# Patient Record
Sex: FEMALE | Race: WHITE | HISPANIC OR LATINO | Employment: FULL TIME | URBAN - METROPOLITAN AREA
[De-identification: names, ages, dates, MRNs, and addresses within clinical notes are randomized per-mention and may not be internally consistent; named-entity substitution may affect disease eponyms.]

---

## 2017-01-06 ENCOUNTER — HOSPITAL ENCOUNTER (OUTPATIENT)
Dept: RADIOLOGY | Facility: HOSPITAL | Age: 42
Discharge: HOME | End: 2017-01-06
Attending: SPECIALIST
Payer: MEDICAID

## 2017-01-06 DIAGNOSIS — R31.1 BENIGN ESSENTIAL MICROSCOPIC HEMATURIA: ICD-10-CM

## 2017-01-06 PROCEDURE — 74178 CT ABD&PLV WO CNTR FLWD CNTR: CPT

## 2017-01-06 RX ADMIN — IOHEXOL 120 ML: 350 INJECTION, SOLUTION INTRAVENOUS at 17:12

## 2017-02-01 ENCOUNTER — ALLSCRIPTS OFFICE VISIT (OUTPATIENT)
Dept: OTHER | Facility: OTHER | Age: 42
End: 2017-02-01

## 2017-09-18 ENCOUNTER — GENERIC CONVERSION - ENCOUNTER (OUTPATIENT)
Dept: OTHER | Facility: OTHER | Age: 42
End: 2017-09-18

## 2017-09-28 ENCOUNTER — GENERIC CONVERSION - ENCOUNTER (OUTPATIENT)
Dept: OTHER | Facility: OTHER | Age: 42
End: 2017-09-28

## 2018-01-09 NOTE — PROGRESS NOTES
Chief Complaint  Pt walked into the office stating that she felt funny,wanted her BP taken because she had taken an extra BP pill  BP was 160/110  When told that she would have to see a dr because of her BP she then told me she had chest pain and feeling funny  Instructed pt to go to the ER to be evaluated  ER called and made aware of pt  Active Problems    1  ACE-inhibitor cough (786 2,E942 6) (R05,T46 4X5A)   2  Depression (311) (F32 9)   3  HTN (hypertension) (401 9) (I10)   4  Lung nodule (793 11) (R91 1)    Current Meds   1  Citalopram Hydrobromide 20 MG Oral Tablet; TAKE 1 TABLET DAILY; Therapy: 04RTD0222 to (Evaluate:30Zoz6767)  Requested for: 83PZW4387; Last   Rx:71Vef2977 Ordered   2  Losartan Potassium 25 MG Oral Tablet; TAKE ONE TABLET BY MOUTH ONCE DAILY; Therapy: 57VHN1461 to (Arno Tonyher)  Requested for: 48Wnh4741; Last   Rx:82Svu7413 Ordered   3  Naprosyn 500 MG Oral Tablet; Therapy: (Recorded:79Chx2142) to Recorded   4  Ventolin  (90 Base) MCG/ACT Inhalation Aerosol Solution; Therapy: (Recorded:90Frx7734) to Recorded    Allergies    1  No Known Drug Allergies    2  No Known Environmental Allergies   3  No Known Food Allergies    Future Appointments    Date/Time Provider Specialty Site   03/11/2016 03:00 PM VINCENZO Dela Cruz   [facility]     Signatures   Electronically signed by : Marin Connell RN; Mar  2 2016  5:27PM EST                       (Author)    Electronically signed by : VINCENZO Mays ; Mar  2 2016  5:44PM EST                       (Author)

## 2018-01-10 NOTE — MISCELLANEOUS
Provider Comments  Provider Comments:   CALLED PT REGARDING MISSED APPT, r/s for 1/11/17, she forgot she had   appt today /AT        Signatures   Electronically signed by : VINCENZO Garcia ; Dec 28 2016 11:25AM EST                       (Author)

## 2018-01-10 NOTE — CONSULTS
Assessment    1  Former smoker (V15 82) (O81 358)   2  Cough variant asthma (493 82) (J45 991)   3  Lung nodule (793 11) (R91 1)    Plan  Cough variant asthma    · Advair Diskus 250-50 MCG/DOSE Inhalation Aerosol Powder Breath Activated;  INHALE 1 PUFF TWICE DAILY   Rx By: Evan Josue; Dispense: 30 Days ; #:1 Aerosol Powder Breath Activated; Refill: 5; For: Cough variant asthma; SAEID = N; Print Rx   · Breo Ellipta 200-25 MCG/INH Inhalation Aerosol Powder Breath Activated; INHALE  1 PUFFS Daily   Rx By: Evan Josue; Dispense: 30 Days ; #:1 Aerosol Powder Breath Activated; Refill: 5; For: Cough variant asthma; SAEID = N; Dispense Sample  Lung nodule    · * CT CHEST WO CONTRAST; Status:Need Information - Financial Authorization; Requested for:20Mar2017;    Perform:[facility]; Order Comments:f/u CT of chest 3/24/16; Due:20Mar2018; Ordered; For:Lung nodule; Ordered By:Martina Clements; Results/Data  PFT Results v2:     Spirometry: Forced vital capacity: 3 02L and 86% Predicted Values  Forced expiratory volume in one second: 2 46L and 84% Predicted Value  FEV1/FVC ratio is 81  Post Bronchodilator Spirometry:   Lung Volumes:   DLCO:    PFT Interpretation:   normal spirometry  Discussion/Summary  Discussion Summary:   Bipin Contreras is here for evaluation of 3mm LLL pulmonary nodule  She has a vague history of smoking as a teenager; repeat CT of chest without contrast will be done in one year  She has a cough; PFT's were normal  I believe the cough could be cough variant asthma  Advair 250/50 1 puff BID is ordered  She has some intermittent pleuritic chest pain and has been taking Indocin  I explained that indomethacin can worsen or exacerbate cough 2nd to asthma  She is going to follow up with PCP at [address] [place] [address] Avenue is invited to follow up in 6 months or as needed Likely, pulmonary nodule is benign finding     Medication SE Review and Pt Understands Tx: Possible side effects of new medications were reviewed with the patient/guardian today  The treatment plan was reviewed with the patient/guardian  The patient/guardian understands and agrees with the treatment plan      Active Problems    · ACE-inhibitor cough (786 2,E942 6) (X99,T29 9Z2E)   · Anterior pleuritic pain (786 52) (R07 81)   · Depression (311) (F32 9)   · HTN (hypertension) (401 9) (I10)   · Lung nodule (793 11) (R91 1)    Chief Complaint  Chief Complaint Free Text Note Form: Pt presents today for cough  Pt states she was in [facility] March 3rd for high blood pressure  Pt states that while in the hosp she had a CT which detected a lung nodule   Pt states that gets SOB when she exerts her self  Pt states that she has some chest pain which started 2 months ago and was given a med which did not help  Pt states that she has chest pain when she coughs  Pt states that the cough is not productive  History of Present Illness  HPI: Loc Mccabe is here for hospital follow up She was admitted for chest pain  CTA of the chest was done 3/24/16 and no PE was seen  3mm LLL nodule was seen; Loc Mccabe is here for evaluation of the same  She has history of uncontrolled hypertension and she is now using ProAir for SOB  She has been using Seretide (salmeterol 25 mcg/Fluticasone 250 mcg 2 puffs in the morning  She has a vague history of smoking; as a teen she smoked for a short time  and electronic hookah  inhaler      Review of Systems  Complete-Female - Pulm:   Constitutional: No fever, no chills, feels well, no tiredness, no recent weight gain or weight loss  Eyes: no complaints of vision problems  ENT: no rhinitis, no PND, no epistaxis  Cardiovascular: no palpitations, no chest pain  Respiratory: as noted in HPI and no shortness of breath    The patient presents with complaints of sudden onset of occasional episodes of moderate cough, described as loose  Gastrointestinal: no complaints of esophageal reflux, no abdominal pain     Genitourinary: no dysuria  Musculoskeletal: no arthralgias, no joint swelling, no myalgias  Integumentary: no rash, no lesions  Neurological: no headache, no fainting, no weakness  Psychiatric: no anxiety, no depression  Hematologic/Lymphatic: - no complaints of swollen glands  Past Medical History  Active Problems And Past Medical History Reviewed: The active problems and past medical history were reviewed and updated today  Surgical History  Surgical History Reviewed: The surgical history was reviewed and updated today  Family History    1  Family history of hypertension (V17 49) (Z82 49)    2  Family history of Type 2 diabetes mellitus with autonomic neuropathy  Family History Reviewed: The family history was reviewed and updated today  Social History    · Former smoker (X94 08) (C69 816)   · Never a smoker   · No alcohol use   · No drug use  Social History Reviewed: The social history was reviewed and updated today  The social history was reviewed and is unchanged  Current Meds   1  Citalopram Hydrobromide 20 MG Oral Tablet; TAKE 1 TABLET DAILY; Therapy: 44AEI9803 to (Evaluate:62Wlv5959)  Requested for: 85GWI2830; Last   Rx:11Feb2016 Ordered   2  Indomethacin 25 MG Oral Capsule; TAKE 1 CAPSULE 3 TIMES DAILY WITH FOOD AS   NEEDED; Therapy: 42MYI1725 to (Evaluate:25Mar2016); Last Rx:11Mar2016 Ordered   3  Losartan Potassium 25 MG Oral Tablet; TAKE ONE TABLET BY MOUTH ONCE DAILY; Therapy: 14JGL7216 to (Evaluate:95Owk8041)  Requested for: 44Mtc2685; Last   Rx:45Mty5867 Ordered   4  Losartan Potassium 50 MG Oral Tablet; TAKE 1 TABLET DAILY; Therapy: 97KCY5919 to (Evaluate:58Acg3965); Last Rx:11Mar2016 Ordered   5  Naprosyn 500 MG Oral Tablet; Therapy: (Recorded:41Kcn0381) to Recorded   6  Ventolin  (90 Base) MCG/ACT Inhalation Aerosol Solution; Therapy: (Recorded:54Cvg3250) to Recorded  Medication List Reviewed: The medication list was reviewed and updated today  Allergies    1  No Known Drug Allergies    2  No Known Environmental Allergies   3  No Known Food Allergies    Vitals  Vital Signs [Data Includes: Current Encounter]    Recorded: [id number] 02:50PM   Temperature 97 8 F, Oral   Heart Rate 78   Pulse Quality Normal   Respiration 12   Respiration Quality Normal   Systolic 342, LUE, Sitting   Diastolic 86, LUE, Sitting   Height 5 ft 3 in   Weight 137 lb    BMI Calculated 24 27   BSA Calculated 1 65   O2 Saturation 97, RA     Physical Exam    Constitutional   General appearance: No acute distress, well appearing and well nourished  Eyes   Examination of pupil and irises: Anicteric, pupils reactive  Ears, Nose, Mouth, and Throat   External inspection of ears and nose: Normal     Nasal mucosa, septum, and turbinates: Normal without edema or erythema  Lips, teeth, and gums: Normal, good dentition  Oropharynx: Normal with no erythema, edema, exudate or lesions  Mallampati Classification: 2  Neck   Neck: Supple, symmetric, trachea midline, no masses  Jugular veins: Normal     Pulmonary   Chest: Normal     Respiratory effort: No increased work of breathing or signs of respiratory distress  Auscultation of lungs: Clear to auscultation, no rales, no crackles, no wheezing  Cardiovascular   Auscultation of heart: Normal rate and rhythm, normal S1 and S2, no murmurs  Examination of extremities for edema and/or varicosities: Normal     Abdomen   Abdomen: Soft, non-tender  Lymphatic   Palpation of lymph nodes in neck: No lymphadenopathy  Musculoskeletal   Gait and station: Normal     Digits and nails: Normal without clubbing or cyanosis  Neurologic   Mental Status: Normal  Not confused, no evidence of dementia, good comprehension, good concentration  Skin   Skin and subcutaneous tissue: Limited exam shows no rash      Psychiatric   Orientation to person, place and time: Normal     Mood and affect: Normal        Signatures   Electronically signed by : JUNIOR Lua; Mar 24 2016  3:17PM EST                       (Author)

## 2018-01-12 NOTE — PROGRESS NOTES
Assessment    1  Encounter for screening mammogram for breast cancer (V76 12) (Z12 31)   2  Screening for cervical cancer (V76 2) (Z12 4)   3  BMI 25 0-25 9,adult (V85 21) (Z68 25)   4  Depression (311) (F32 9)   5  HTN (hypertension) (401 9) (I10)   6  Lung nodule (793 11) (R91 1)   7  Hematuria (599 70) (R31 9)   8  Encounter for preventive health examination (V70 0) (Z00 00)   9  Screening for cholesterol level (V77 91) (Z13 220)   10  Need for influenza vaccination (V04 81) (Z23)    Plan  Health Maintenance, Encounter for screening mammogram for breast cancer    · * MAMMO SCREENING BILATERAL W CAD; Status:Hold For - Scheduling; Requested  for:25Nov2016;   Hematuria    · Urology Referral Other Physician Referral  Consult  Status: Hold For - Scheduling   Requested for: 99YLH8211  are Referring to a non-SL Preferred Provider : Insurance Coverage  Care Summary provided  : Yes   · (1) Elsy; Status:Resulted - Requires Verification;   Done:  88KHE2939 10:05AM   · (1) CBC/PLT/DIFF; Status:Resulted - Requires Verification;   Done: 40JEC0682 10:05AM  Need for influenza vaccination    · Fluzone Quadrivalent 0 5 ML Intramuscular Suspension  Screening for cervical cancer    · (1) THIN PREP PAP FOLLOW UP WITH IMAGING; Status:Active; Requested  for:25Nov2016;   Maturation index required? : No  HPV? : Regardless of Interpretation  Screening for cholesterol level, Screening for diabetes mellitus    · (1) LIPID PANEL, FASTING; Status:Active;  Requested for:25Nov2016;   Screening for diabetes mellitus    · (1) HEMOGLOBIN A1C; Status:Resulted - Requires Verification;   Done: 87LZP1203  10:05AM    Discussion/Summary    Ref[facility] to [facility] for f/u with psychiatry  Counselled pt that she should not have suddenly discontinued her medication on her own  Pt refuses to start any other antidepressants prior to seeing [facility]  Gave requisition for CBC, BMP, HbA1C, Lipid panel, Mammogram  Referral to Urology for chronic microhematuria  Urine for microscopy, Cx  Flu shot administered  Lifestyle and medication compliance counselling  Pap with reflex HPV done  D/W Dr Patel Lobato  Chief Complaint  patient here for CP and PAP and pelvic, would like to discuss urine issues      History of Present Illness  HPI: 35 yo F with a Hx of HTN, lung nodule and Depression presents for her CPE  Pt states she has been complinat with her HTN meds, with her highest recorded /90 1 month ago  She states that 1 week ago she discontinued her Citalopram as she thought it was not helping her  She still has occasional feeings of depression and has not followed with [facility] as she was told there are no [place] speaking counsellors or psychiatrists  Denies any chest pain, sob, headaches, change in vision, fever, dysuria,frequency, abdominal pain  No acute concerns expressed today      Review of Systems    Constitutional: no fever, not feeling poorly, no chills and not feeling tired  Eyes: no eyesight problems and no purulent discharge from the eyes  ENT: no sore throat, no hearing loss, no nasal discharge and no hoarseness  Cardiovascular: No complaints of slow heart rate, no fast heart rate, no chest pain, no palpitations, no leg claudication, no lower extremity edema  Respiratory: No complaints of shortness of breath, no wheezing, no cough, no SOB on exertion, no orthopnea, no PND  Gastrointestinal: No complaints of abdominal pain, no constipation, no nausea or vomiting, no diarrhea, no bloody stools  Genitourinary: no dysuria and no incontinence  Musculoskeletal: no arthralgias and no myalgias  Integumentary: no rashes and no skin lesions  Neurological: no headache, no numbness, no tingling, no confusion, no dizziness and no difficulty walking  Psychiatric: depression  Hematologic/Lymphatic: no swollen glands and no swollen glands in the neck       Over the past 2 weeks, how often have you been bothered by the following problems? 1 ) Little interest or pleasure in doing things? Several days  2 ) Feeling down, depressed or hopeless? Several days  3 ) Trouble falling asleep or sleeping too much? Several days  4 ) Feeling tired or having little energy? Not at all    5 ) Poor appetite or overeating? Not at all    6 ) Feeling bad about yourself, or that you are a failure, or have let yourself or your family down? Not at all    7 ) Trouble concentrating on things, such as reading a newspaper or watching television? Not at all    8 ) Moving or speaking so slowly that other people could have noticed, or the opposite, moving or speaking faster than usual? Not at all    9 ) Thoughts that you would be better off dead or of hurting yourself in some way? Not at all  Score 3      Active Problems    1  ACE-inhibitor cough (786 2,E942 6) (R05,T46 4X5A)   2  Cerumen impaction (380 4) (H61 20)   3  Cough variant asthma (493 82) (J45 991)   4  Depression (311) (F32 9)   5  Hematuria (599 70) (R31 9)   6  HTN (hypertension) (401 9) (I10)   7  Lung nodule (793 11) (R91 1)    Past Medical History    · History of Anterior pleuritic pain (786 52) (R07 81)    Family History  Mother    · Family history of hypertension (V17 49) (Z82 49)  Father    · Family history of Type 2 diabetes mellitus with autonomic neuropathy    Social History    · Former smoker (V15 82) (Q49 769)   · Never a smoker   · No alcohol use   · No drug use    Current Meds   1  Advair Diskus 250-50 MCG/DOSE Inhalation Aerosol Powder Breath Activated; INHALE   1 PUFF TWICE DAILY; Therapy: (Recorded:29Tza4595) to Recorded   2  Citalopram Hydrobromide 40 MG Oral Tablet; take 1 tablet by mouth once daily; Therapy: 14CHE7473 to (Evaluate:03Jan2017)  Requested for: 77JWE5102; Last   Rx:51Upx9580 Ordered   3  HydroCHLOROthiazide 12 5 MG Oral Tablet; TAKE 1 TABLET DAILY;    Therapy: 99BYJ7470 to (Evaluate:10Jan2017)  Requested for: 98NEY3589; Last Rx: 75BVJ8162 Ordered   4  Losartan Potassium 50 MG Oral Tablet; Take 1 tablet twice daily; Therapy: 64TEW9038 to (Guevara Bue)  Requested for: 81SKN5114; Last   Rx:18Vrl9149 Ordered   5  Ventolin  (90 Base) MCG/ACT Inhalation Aerosol Solution; INHALE 1 TO 2   PUFFS EVERY 4 TO 6 HOURS AS NEEDED  Requested for: 19GMQ2773; Last   NA:78VMT3151 Ordered    Allergies    1  No Known Drug Allergies    2  No Known Environmental Allergies   3  No Known Food Allergies    Vitals   Recorded: 46HBI2145 08:13AM   Temperature 97 1 F   Heart Rate 67   Respiration 18   Systolic 98, LUE, Sitting   Diastolic 70, LUE, Sitting   Height 5 ft 3 in   Weight 146 lb    BMI Calculated 25 86   BSA Calculated 1 69     Physical Exam    Constitutional   General appearance: No acute distress, well appearing and well nourished  Head and Face   Head and face: Normal     Palpation of the face and sinuses: No sinus tenderness  Eyes   Conjunctiva and lids: No swelling, erythema or discharge  Pupils and irises: Equal, round, reactive to light  Ophthalmoscopic examination: Normal fundi and optic discs  Ears, Nose, Mouth, and Throat   External inspection of ears and nose: Normal     Otoscopic examination: Tympanic membranes translucent with normal light reflex  Canals patent without erythema  Nasal mucosa, septum, and turbinates: Normal without edema or erythema  Lips, teeth, and gums: Normal, good dentition  Oropharynx: Normal with no erythema, edema, exudate or lesions  Neck   Neck: Supple, symmetric, trachea midline, no masses  Thyroid: Normal, no thyromegaly  Pulmonary   Respiratory effort: No increased work of breathing or signs of respiratory distress  Auscultation of lungs: Clear to auscultation  Cardiovascular   Auscultation of heart: Normal rate and rhythm, normal S1 and S2, no murmurs  Pedal pulses: 2+ bilaterally      Peripheral vascular exam: Normal     Examination of extremities for edema and/or varicosities: Normal     Chest   Breasts: Normal, no dimpling or skin changes appreciated  Palpation of breasts and axillae: Normal, no masses palpated  Chest: Normal     Abdomen   Abdomen: Non-tender, no masses  Liver and spleen: No hepatomegaly or splenomegaly  Examination for hernias: No hernia appreciated  Genitourinary   External genitalia and vagina: Normal, no lesions appreciated  Urethra: Normal, no discharge  Bladder: Not distended, no tenderness  Cervix: Normal, no lesions, Pap obtained  Uterus: Normal size, no tenderness, no masses  Adnexa/Parametria: Normal, no masses or tenderness  Lymphatic   Palpation of lymph nodes in neck: No lymphadenopathy  Palpation of lymph nodes in axillae: No lymphadenopathy  Palpation of lymph nodes in groin: No lymphadenopathy  Musculoskeletal   Gait and station: Normal     Digits and nails: Normal without clubbing or cyanosis  Joints, bones, and muscles: Normal     Range of motion: Normal     Muscle strength/tone: Normal     Skin   Skin and subcutaneous tissue: Normal without rashes or lesions  Palpation of skin and subcutaneous tissue: Normal turgor  Neurologic   Cranial nerves: Cranial nerves II-XII intact  Reflexes: 2+ and symmetric  Sensation: No sensory loss  Psychiatric   Judgment and insight: Normal     Orientation to person, place, and time: Normal     Mood and affect: Normal        Results/Data  (1) CBC/PLT/DIFF 31YPS9840 10:05AM Candace Reyes     Test Name Result Flag Reference   WBC 7 2 x10E3/uL  3 4-10 8   RBC 4 23 x10E6/uL  3 77-5 28   Hemoglobin 13 3 g/dL  11 1-15 9   Hematocrit 38 4 %  34 0-46  6   MCV 91 fL  79-97   MCH 31 4 pg  26 6-33 0   MCHC 34 6 g/dL  31 5-35 7   RDW 13 2 %  12 3-15 4   Platelets 895 P37H4/YW  150-379   Neutrophils 63 %     Lymphs 28 %     Monocytes 8 %     Eos 1 %     Basos 0 %     Neutrophils (Absolute) 4 6 x10E3/uL  1 4-7 0   Lymphs (Absolute) 2 0 x10E3/uL  0 7-3 1 Monocytes(Absolute) 0 6 x10E3/uL  0 1-0 9   Eos (Absolute) 0 1 x10E3/uL  0 0-0 4   Baso (Absolute) 0 0 x10E3/uL  0 0-0 2   Immature Granulocytes 0 %     Immature Grans (Abs) 0 0 x10E3/uL  0 0-0 1     (1) BASIC METABOLIC PROFILE 57YYE1380 10:05AM Candace Reyes     Test Name Result Flag Reference   Glucose, Serum 84 mg/dL  65-99   BUN 22 mg/dL  6-24   Creatinine, Serum 0 80 mg/dL  0 57-1 00   eGFR If NonAfricn Am 93 mL/min/1 73  >59   eGFR If Africn Am 107 mL/min/1 73  >59   BUN/Creatinine Ratio 28 H 9-23   Sodium, Serum 138 mmol/L  136-144   Potassium, Serum 4 1 mmol/L  3 5-5 2   Chloride, Serum 102 mmol/L     Carbon Dioxide, Total 20 mmol/L  18-29   Calcium, Serum 9 0 mg/dL  8 7-10 2     (1) HEMOGLOBIN A1C 25Nov2016 10:05AM Candace Reyes     Test Name Result Flag Reference   Hemoglobin A1c 5 5 %  4 8-5 6   Pre-diabetes: 5 7 - 6 4           Diabetes: >6 4           Glycemic control for adults with diabetes: <7 0       Attending Note  Attending Note: Attending Note: I discussed the case with the Resident and reviewed the Resident's note and I agree with the Resident management plan as it was presented to me  Level of Participation: I was present in clinic, but did not examine the patient  Future Appointments    Date/Time Provider Specialty Site   12/28/2016 08:30 AM VINCENZO Elizabeth  [facility]     Signatures   Electronically signed by : VINCENZO Marquez ; Nov 25 2016  4:17PM EST                       (Author)    Electronically signed by :  ABE Sanderson ; Nov 28 2016 12:06PM EST                       (Author)

## 2018-01-13 NOTE — PROGRESS NOTES
Assessment    1  HTN (hypertension) (401 9) (I10)   2  ACE-inhibitor cough (786 2,E942 6) (H30,G37 1Y6U)    Plan  ACE-inhibitor cough, HTN (hypertension)    · Losartan Potassium 25 MG Oral Tablet; TAKE ONE TABLET BY MOUTH ONCE  DAILY  HTN (hypertension)    · Lisinopril 20 MG Oral Tablet    Discussion/Summary    Dry cough after starting lisinopril without evidence of URI  will DC lisinopril 2/2 SE and start ARB  RTC in 1 week for BP check and evaluation for SE  Possible side effects of new medications were reviewed with the patient/guardian today  The treatment plan was reviewed with the patient/guardian  The patient/guardian understands and agrees with the treatment plan      Chief Complaint  F/U HTN      History of Present Illness  HPI: 37 yo with HTN, recently started on lisinopril because of SE to verapamil  Now presents with 1 week of a constant dry cough  Also had blurred vision, HA, and dizziness on Saturday after taking ACEi but that resolved  Denies fevers, chills, sick contact, constitutional symptoms  No known alleviating or aggravating factors  Review of Systems    Constitutional: as noted in HPI, no fever and no chills  ENT: no sore throat, no nasal discharge and no hoarseness  Cardiovascular: no chest pain, no intermittent leg claudication, no palpitations and no lower extremity edema  Respiratory: cough, but no shortness of breath, no wheezing and no shortness of breath during exertion  Gastrointestinal: no abdominal pain, no nausea, no vomiting, no constipation and no diarrhea  Musculoskeletal: no arthralgias and no myalgias  Active Problems    1  HTN (hypertension) (401 9) (I10)    Family History    1  Family history of hypertension (V17 49) (Z82 49)    2  Family history of Type 2 diabetes mellitus with autonomic neuropathy    Social History    · Never a smoker   · No alcohol use   · No drug use    Current Meds   1   Lisinopril 20 MG Oral Tablet; take 1 tablet by mouth once daily; Therapy: [id number] to (Last Rx:12Jan2016) Ordered    Allergies    1  No Known Drug Allergies    Vitals   Recorded: [id number] 04:20PM   Temperature 98 4 F   Heart Rate 80   Respiration 20   Systolic 729   Diastolic 56   Height 5 ft 3 in   Weight 131 lb 4 oz   BMI Calculated 23 25   BSA Calculated 1 62   O2 Saturation 95     Physical Exam    Constitutional   General appearance: No acute distress, well appearing and well nourished  Ears, Nose, Mouth, and Throat   External inspection of ears and nose: Normal     Otoscopic examination: Tympanic membranes translucent with normal light reflex  Canals patent without erythema  Nasal mucosa, septum, and turbinates: Normal without edema or erythema  Oropharynx: Normal with no erythema, edema, exudate or lesions  Pulmonary   Respiratory effort: No increased work of breathing or signs of respiratory distress  Auscultation of lungs: Clear to auscultation  Cardiovascular   Auscultation of heart: Normal rate and rhythm, normal S1 and S2, without murmurs  Musculoskeletal   Inspection/palpation of joints, bones, and muscles: Normal     Psychiatric   Mood and affect: Normal          Results/Data  eCalcs - Health Calculators [id number] 04:29PM User, Ahs     Test Name Result Flag Reference   SBIRT Screen - Tobacco Screening Result Negative         Attending Note  Attending Note [facility]: Attending Note: I discussed the case with the Resident and reviewed the Resident's note and I agree with the Resident management plan as it was presented to me  Signatures   Electronically signed by : Maday Valle MD; Jan 26 2016  9:09PM EST                       (Author)    Electronically signed by :  ABE Thacker ; Jan 27 2016 10:01AM EST                       (Author)

## 2018-01-13 NOTE — MISCELLANEOUS
Message   Recorded as Task   Date: 09/19/2017 03:23 PM, Created By: Jamia Barrientos   Task Name: Med Renewal   Assigned To: Candace Reyes   Regarding Patient: Bethany Nichols, Status: In Progress   Comment:    Nataly Busby - 19 Sep 2017 3:23 PM     TASK CREATED  TELMISA HCTZ 40-12 5MG TAB NOT COVERED INSURANCE PLAN REQUEST LOSART/HCTZ OR GENERIC ACE INHIB/HCTZ TO [facility]   Candace Reyes - 19 Sep 2017 5:02 PM     TASK REASSIGNED: Previously Assigned To [employer],team   Anmolsingh,Candace - 21 Sep 2017 4:11 PM     TASK EDITED  Attempted telephoning patient multiple times however no voicemail available  Candace Reyes - 21 Sep 2017 4:11 PM     TASK IN PROGRESS   Candace Reyes - 22 Sep 2017 10:32 AM     TASK EDITED  tried calling pt again, no response  Candace Reyes - 28 Sep 2017 11:27 AM     TASK EDITED  Finally got through to patient and informed her that Micardis is not currently being covered by her insurance  Patient is not willing to pay out of pocket which would cost $130 monthly  We'll resume losartan 50 mg p o  b i d , HCTZ 12 5 mg p o  daily  Patient advised to keep a log of daily blood pressures and will review in 2 weeks  Patient verbalized understanding          Plan  Cough variant asthma    · Advair Diskus 250-50 MCG/DOSE Inhalation Aerosol Powder Breath Activated;  INHALE 1 PUFF TWICE DAILY  HTN (hypertension)    · Telmisartan-HCTZ 40-12 5 MG Oral Tablet (Micardis HCT)   · HydroCHLOROthiazide 12 5 MG Oral Tablet; TAKE 1 TABLET DAILY   · Losartan Potassium 50 MG Oral Tablet; take one tablet by mouth twice daily    Signatures   Electronically signed by : VINCENZO Norris ; Sep 28 2017 11:27AM EST                       (Author)

## 2018-01-14 VITALS
RESPIRATION RATE: 18 BRPM | OXYGEN SATURATION: 98 % | SYSTOLIC BLOOD PRESSURE: 112 MMHG | BODY MASS INDEX: 25.52 KG/M2 | HEART RATE: 67 BPM | HEIGHT: 63 IN | DIASTOLIC BLOOD PRESSURE: 70 MMHG | WEIGHT: 144 LBS

## 2018-01-22 VITALS
OXYGEN SATURATION: 99 % | HEART RATE: 79 BPM | RESPIRATION RATE: 18 BRPM | WEIGHT: 141 LBS | HEIGHT: 63 IN | DIASTOLIC BLOOD PRESSURE: 70 MMHG | SYSTOLIC BLOOD PRESSURE: 100 MMHG | BODY MASS INDEX: 24.98 KG/M2

## 2018-02-26 DIAGNOSIS — I10 ESSENTIAL HYPERTENSION: Primary | ICD-10-CM

## 2018-02-26 RX ORDER — HYDROCHLOROTHIAZIDE 12.5 MG/1
1 TABLET ORAL DAILY
COMMUNITY
Start: 2016-05-05 | End: 2018-02-26 | Stop reason: SDUPTHER

## 2018-02-28 RX ORDER — HYDROCHLOROTHIAZIDE 12.5 MG/1
12.5 TABLET ORAL DAILY
Qty: 30 TABLET | Refills: 3 | Status: SHIPPED | OUTPATIENT
Start: 2018-02-28 | End: 2018-10-15

## 2018-03-20 ENCOUNTER — HOSPITAL ENCOUNTER (EMERGENCY)
Facility: HOSPITAL | Age: 43
Discharge: HOME | End: 2018-03-20
Attending: EMERGENCY MEDICINE | Admitting: EMERGENCY MEDICINE
Payer: COMMERCIAL

## 2018-03-20 ENCOUNTER — APPOINTMENT (EMERGENCY)
Dept: RADIOLOGY | Facility: HOSPITAL | Age: 43
End: 2018-03-20
Payer: COMMERCIAL

## 2018-03-20 VITALS
TEMPERATURE: 99.5 F | SYSTOLIC BLOOD PRESSURE: 134 MMHG | HEART RATE: 69 BPM | BODY MASS INDEX: 23.92 KG/M2 | HEIGHT: 63 IN | WEIGHT: 135 LBS | OXYGEN SATURATION: 98 % | DIASTOLIC BLOOD PRESSURE: 79 MMHG | RESPIRATION RATE: 18 BRPM

## 2018-03-20 DIAGNOSIS — J18.9 RIGHT LOWER LOBE PNEUMONIA: Primary | ICD-10-CM

## 2018-03-20 DIAGNOSIS — D72.825 BANDEMIA: ICD-10-CM

## 2018-03-20 LAB
ANION GAP SERPL CALCULATED.3IONS-SCNC: 11 MMOL/L (ref 4–13)
APTT PPP: 26 SECONDS (ref 23–35)
BASOPHILS # BLD AUTO: 0.03 THOUSAND/UL (ref 0–0.1)
BASOPHILS NFR MAR MANUAL: 1 % (ref 0–1)
BUN SERPL-MCNC: 11 MG/DL (ref 5–25)
CALCIUM SERPL-MCNC: 8.8 MG/DL (ref 8.3–10.1)
CHLORIDE SERPL-SCNC: 104 MMOL/L (ref 100–108)
CO2 SERPL-SCNC: 27 MMOL/L (ref 21–32)
CREAT SERPL-MCNC: 0.83 MG/DL (ref 0.6–1.3)
ERYTHROCYTE [DISTWIDTH] IN BLOOD BY AUTOMATED COUNT: 13.7 % (ref 11.6–15.1)
GFR SERPL CREATININE-BSD FRML MDRD: 87 ML/MIN/1.73SQ M
GLUCOSE SERPL-MCNC: 104 MG/DL (ref 65–140)
HCT VFR BLD AUTO: 35.2 % (ref 37–47)
HGB BLD-MCNC: 11.6 G/DL (ref 12–16)
INR PPP: 0.98 (ref 0.86–1.16)
LG PLATELETS BLD QL SMEAR: PRESENT
LYMPHOCYTES # BLD AUTO: 1.38 THOUSAND/UL (ref 0.6–4.47)
LYMPHOCYTES # BLD AUTO: 53 %
MCH RBC QN AUTO: 30.9 PG (ref 27–31)
MCHC RBC AUTO-ENTMCNC: 33 G/DL (ref 31.4–37.4)
MCV RBC AUTO: 94 FL (ref 82–98)
MONOCYTES # BLD AUTO: 0.13 THOUSAND/UL (ref 0–1.22)
MONOCYTES NFR BLD AUTO: 5 % (ref 4–12)
NEUTS BAND NFR BLD MANUAL: 6 % (ref 0–8)
NEUTS SEG # BLD: 1.07 THOUSAND/UL (ref 1.81–6.82)
NEUTS SEG NFR BLD AUTO: 35 %
NRBC BLD AUTO-RTO: 0 /100 WBCS
NT-PROBNP SERPL-MCNC: 25 PG/ML
PLATELET # BLD AUTO: 215 THOUSANDS/UL (ref 130–400)
PLATELET BLD QL SMEAR: ADEQUATE
PMV BLD AUTO: 7.9 FL (ref 8.9–12.7)
POTASSIUM SERPL-SCNC: 3.4 MMOL/L (ref 3.5–5.3)
PROTHROMBIN TIME: 10.3 SECONDS (ref 9.4–11.7)
RBC # BLD AUTO: 3.75 MILLION/UL (ref 4.2–5.4)
SODIUM SERPL-SCNC: 142 MMOL/L (ref 136–145)
TOTAL CELLS COUNTED SPEC: 100
TROPONIN I SERPL-MCNC: 0.02 NG/ML
WBC # BLD AUTO: 2.6 THOUSAND/UL (ref 4.8–10.8)

## 2018-03-20 PROCEDURE — 85610 PROTHROMBIN TIME: CPT | Performed by: EMERGENCY MEDICINE

## 2018-03-20 PROCEDURE — 85027 COMPLETE CBC AUTOMATED: CPT | Performed by: EMERGENCY MEDICINE

## 2018-03-20 PROCEDURE — 80048 BASIC METABOLIC PNL TOTAL CA: CPT | Performed by: EMERGENCY MEDICINE

## 2018-03-20 PROCEDURE — 36415 COLL VENOUS BLD VENIPUNCTURE: CPT | Performed by: EMERGENCY MEDICINE

## 2018-03-20 PROCEDURE — 96374 THER/PROPH/DIAG INJ IV PUSH: CPT

## 2018-03-20 PROCEDURE — 94640 AIRWAY INHALATION TREATMENT: CPT

## 2018-03-20 PROCEDURE — 71045 X-RAY EXAM CHEST 1 VIEW: CPT

## 2018-03-20 PROCEDURE — 99284 EMERGENCY DEPT VISIT MOD MDM: CPT

## 2018-03-20 PROCEDURE — 83880 ASSAY OF NATRIURETIC PEPTIDE: CPT | Performed by: EMERGENCY MEDICINE

## 2018-03-20 PROCEDURE — 84484 ASSAY OF TROPONIN QUANT: CPT | Performed by: EMERGENCY MEDICINE

## 2018-03-20 PROCEDURE — 85730 THROMBOPLASTIN TIME PARTIAL: CPT | Performed by: EMERGENCY MEDICINE

## 2018-03-20 PROCEDURE — 93005 ELECTROCARDIOGRAM TRACING: CPT

## 2018-03-20 PROCEDURE — 85007 BL SMEAR W/DIFF WBC COUNT: CPT | Performed by: EMERGENCY MEDICINE

## 2018-03-20 RX ORDER — IPRATROPIUM BROMIDE AND ALBUTEROL SULFATE 2.5; .5 MG/3ML; MG/3ML
3 SOLUTION RESPIRATORY (INHALATION) ONCE
Status: COMPLETED | OUTPATIENT
Start: 2018-03-20 | End: 2018-03-20

## 2018-03-20 RX ORDER — FLUTICASONE PROPIONATE 50 MCG
2 SPRAY, SUSPENSION (ML) NASAL DAILY
COMMUNITY

## 2018-03-20 RX ORDER — METHYLPREDNISOLONE SODIUM SUCCINATE 125 MG/2ML
125 INJECTION, POWDER, LYOPHILIZED, FOR SOLUTION INTRAMUSCULAR; INTRAVENOUS ONCE
Status: COMPLETED | OUTPATIENT
Start: 2018-03-20 | End: 2018-03-20

## 2018-03-20 RX ORDER — AZITHROMYCIN 250 MG/1
250 TABLET, FILM COATED ORAL DAILY
Qty: 6 TABLET | Refills: 0 | Status: SHIPPED | OUTPATIENT
Start: 2018-03-20 | End: 2018-03-25

## 2018-03-20 RX ORDER — AZITHROMYCIN 250 MG/1
500 TABLET, FILM COATED ORAL ONCE
Status: COMPLETED | OUTPATIENT
Start: 2018-03-20 | End: 2018-03-20

## 2018-03-20 RX ORDER — LAMOTRIGINE 25 MG/1
25 TABLET ORAL
COMMUNITY
End: 2018-10-15

## 2018-03-20 RX ORDER — QUETIAPINE FUMARATE 50 MG/1
50 TABLET, FILM COATED ORAL
COMMUNITY
End: 2018-10-15

## 2018-03-20 RX ORDER — CLONAZEPAM 1 MG/1
1 TABLET ORAL
COMMUNITY

## 2018-03-20 RX ORDER — PRAZOSIN HYDROCHLORIDE 2 MG/1
5 CAPSULE ORAL
COMMUNITY

## 2018-03-20 RX ORDER — SODIUM CHLORIDE FOR INHALATION 0.9 %
3 VIAL, NEBULIZER (ML) INHALATION ONCE
Status: DISCONTINUED | OUTPATIENT
Start: 2018-03-20 | End: 2018-03-20

## 2018-03-20 RX ADMIN — METHYLPREDNISOLONE SODIUM SUCCINATE 125 MG: 125 INJECTION, POWDER, FOR SOLUTION INTRAMUSCULAR; INTRAVENOUS at 22:12

## 2018-03-20 RX ADMIN — IPRATROPIUM BROMIDE AND ALBUTEROL SULFATE 3 ML: .5; 3 SOLUTION RESPIRATORY (INHALATION) at 22:06

## 2018-03-20 RX ADMIN — AZITHROMYCIN 500 MG: 250 TABLET, FILM COATED ORAL at 23:05

## 2018-03-21 LAB
ATRIAL RATE: 70 BPM
P AXIS: 65 DEGREES
PR INTERVAL: 178 MS
QRS AXIS: 43 DEGREES
QRSD INTERVAL: 78 MS
QT INTERVAL: 430 MS
QTC INTERVAL: 464 MS
T WAVE AXIS: 26 DEGREES
VENTRICULAR RATE: 70 BPM

## 2018-03-21 PROCEDURE — 93010 ELECTROCARDIOGRAM REPORT: CPT | Performed by: INTERNAL MEDICINE

## 2018-03-21 NOTE — ED PROVIDER NOTES
History  Chief Complaint   Patient presents with    Asthma     asthma attack around 6pm   cough  took inhaler  after using pain in chest  cough started Friday  History provided by:  Patient  Shortness of Breath   Severity:  Moderate  Onset quality:  Gradual  Timing:  Constant  Progression:  Worsening  Chronicity:  New  Context comment:  Hx of asthma, took albuterol at home with no improvement in sxs  Relieved by:  Nothing  Worsened by: Activity, coughing, deep breathing, exertion and movement  Ineffective treatments:  None tried  Associated symptoms: chest pain, cough and wheezing    Associated symptoms: no abdominal pain, no fever, no headaches, no rash, no sore throat, no sputum production, no syncope and no swollen glands        Prior to Admission Medications   Prescriptions Last Dose Informant Patient Reported? Taking? Mometasone Furo-Formoterol Fum (DULERA) 200-5 MCG/ACT AERO Past Week at Unknown time  Yes Yes   Sig: Inhale   QUEtiapine (SEROquel) 50 mg tablet 3/19/2018 at Unknown time  Yes Yes   Sig: Take 50 mg by mouth daily at bedtime as needed   albuterol (PROVENTIL HFA,VENTOLIN HFA) 90 mcg/act inhaler 3/20/2018 at Unknown time  No Yes   Sig: Inhale 2 puffs every 4 (four) hours as needed for wheezing or shortness of breath (or cough)     clonazePAM (KlonoPIN) 1 mg tablet 3/19/2018 at Unknown time  Yes Yes   Sig: Take 1 mg by mouth daily at bedtime as needed for seizures   fluticasone (FLONASE) 50 mcg/act nasal spray 3/20/2018 at Unknown time  Yes Yes   Si sprays into each nostril daily   hydrochlorothiazide (HYDRODIURIL) 12 5 mg tablet 3/20/2018 at Unknown time  No Yes   Sig: Take 1 tablet (12 5 mg total) by mouth daily   lamoTRIgine (LaMICtal) 25 mg tablet 3/19/2018 at Unknown time  Yes Yes   Sig: Take 25 mg by mouth daily at bedtime   losartan (COZAAR) 25 mg tablet 3/20/2018 at Unknown time  Yes Yes   Sig: Take 25 mg by mouth    meclizine (ANTIVERT) 25 mg tablet Past Week at Unknown time Yes Yes   Sig: Take 25 mg by mouth 3 (three) times a day as needed for dizziness  prazosin (MINIPRESS) 2 mg capsule 3/19/2018 at Unknown time  Yes Yes   Sig: Take 2 mg by mouth daily at bedtime      Facility-Administered Medications: None       Past Medical History:   Diagnosis Date    Asthma     Hypertension        Past Surgical History:   Procedure Laterality Date    CHOLECYSTECTOMY         Family History   Problem Relation Age of Onset    Hypertension Mother     Diabetes Father     Pulmonary embolism Father      I have reviewed and agree with the history as documented  Social History   Substance Use Topics    Smoking status: Never Smoker    Smokeless tobacco: Never Used    Alcohol use No        Review of Systems   Constitutional: Negative for chills and fever  HENT: Negative for rhinorrhea, sore throat and trouble swallowing  Eyes: Negative for pain  Respiratory: Positive for cough, chest tightness, shortness of breath and wheezing  Negative for sputum production and stridor  Cardiovascular: Positive for chest pain  Negative for leg swelling and syncope  Gastrointestinal: Negative for abdominal pain, diarrhea and nausea  Endocrine: Negative for polyuria  Genitourinary: Negative for dysuria, flank pain and urgency  Musculoskeletal: Negative for joint swelling, myalgias and neck stiffness  Skin: Negative for rash  Allergic/Immunologic: Negative for immunocompromised state  Neurological: Negative for dizziness, syncope, weakness, numbness and headaches  Psychiatric/Behavioral: Negative for confusion and suicidal ideas  All other systems reviewed and are negative        Physical Exam  ED Triage Vitals [03/20/18 2140]   Temperature Pulse Respirations Blood Pressure SpO2   99 5 °F (37 5 °C) 69 18 134/79 98 %      Temp Source Heart Rate Source Patient Position - Orthostatic VS BP Location FiO2 (%)   Tympanic Monitor Sitting Right arm --      Pain Score       9 Orthostatic Vital Signs  Vitals:    03/20/18 2140   BP: 134/79   Pulse: 69   Patient Position - Orthostatic VS: Sitting       Physical Exam   Constitutional: She is oriented to person, place, and time  She appears well-developed and well-nourished  HENT:   Head: Normocephalic and atraumatic  Eyes: EOM are normal  Pupils are equal, round, and reactive to light  Neck: Normal range of motion  Neck supple  Cardiovascular: Normal rate and regular rhythm  Exam reveals no friction rub  No murmur heard  Pulmonary/Chest: No respiratory distress  She has no wheezes  She has rales (right lung base )  Abdominal: Soft  Bowel sounds are normal  She exhibits no distension  There is no tenderness  Musculoskeletal: Normal range of motion  She exhibits no edema or tenderness  Neurological: She is alert and oriented to person, place, and time  Skin: Skin is warm  No rash noted  Psychiatric: She has a normal mood and affect  Nursing note and vitals reviewed        ED Medications  Medications   ipratropium-albuterol (DUO-NEB) 0 5-2 5 mg/3 mL inhalation solution 3 mL (3 mL Nebulization Given 3/20/18 2206)   methylPREDNISolone sodium succinate (Solu-MEDROL) injection 125 mg (125 mg Intravenous Given 3/20/18 2212)   azithromycin (ZITHROMAX) tablet 500 mg (500 mg Oral Given 3/20/18 2305)       Diagnostic Studies  Results Reviewed     Procedure Component Value Units Date/Time    Basic metabolic panel [[id number]]  (Abnormal) Collected:  03/20/18 2209    Lab Status:  Final result Specimen:  Blood from Arm, Left Updated:  03/20/18 2241     Sodium 142 mmol/L      Potassium 3 4 (L) mmol/L      Chloride 104 mmol/L      CO2 27 mmol/L      Anion Gap 11 mmol/L      BUN 11 mg/dL      Creatinine 0 83 mg/dL      Glucose 104 mg/dL      Calcium 8 8 mg/dL      eGFR 87 ml/min/1 73sq m     Narrative:         National Kidney Disease Education Program recommendations are as follows:  GFR calculation is accurate only with a steady state creatinine  Chronic Kidney disease less than 60 ml/min/1 73 sq  meters  Kidney failure less than 15 ml/min/1 73 sq  meters  NT-BNP PRO [[id number]]  (Normal) Collected:  03/20/18 2209    Lab Status:  Final result Specimen:  Blood from Arm, Left Updated:  03/20/18 2241     NT-proBNP 25 pg/mL     Troponin I [[id number]]  (Normal) Collected:  03/20/18 2209    Lab Status:  Final result Specimen:  Blood from Arm, Left Updated:  03/20/18 2239     Troponin I 0 02 ng/mL     Narrative:         [employer] Chemistry analyzer 99% cutoff is > 0 04 ng/mL in network labs    o cTnI 99% cutoff is useful only when applied to patients in the clinical setting of myocardial ischemia  o cTnI 99% cutoff should be interpreted in the context of clinical history, ECG findings and possibly cardiac imaging to establish correct diagnosis  o cTnI 99% cutoff may be suggestive but clearly not indicative of a coronary event without the clinical setting of myocardial ischemia  CBC and differential [[id number]]  (Abnormal) Collected:  03/20/18 2209    Lab Status:  Final result Specimen:  Blood from Arm, Left Updated:  03/20/18 2236     WBC 2 60 (L) Thousand/uL      RBC 3 75 (L) Million/uL      Hemoglobin 11 6 (L) g/dL      Hematocrit 35 2 (L) %      MCV 94 fL      MCH 30 9 pg      MCHC 33 0 g/dL      RDW 13 7 %      MPV 7 9 (L) fL      Platelets 908 Thousands/uL      nRBC 0 /100 WBCs     Protime-INR [[id number]]  (Normal) Collected:  03/20/18 2209    Lab Status:  Final result Specimen:  Blood from Arm, Left Updated:  03/20/18 2236     Protime 10 3 seconds      INR 0 98    APTT [[id number]]  (Normal) Collected:  03/20/18 2209    Lab Status:  Final result Specimen:  Blood from Arm, Left Updated:  03/20/18 2236     PTT 26 seconds     Narrative:          Therapeutic Heparin Range = 60-90 seconds                 XR chest portable   ED Interpretation by Nerissa Ramey DO (03/20 2252)   Possible right lower ext, infiltrate                  Procedures  ECG 12 Lead Documentation  Date/Time: 3/20/2018 9:52 PM  Performed by: Eric Fuentes by: Alissa Crowder     ECG reviewed by me, the ED Provider: yes    Patient location:  ED  Previous ECG:     Previous ECG:  Compared to current    Similarity:  No change  Interpretation:     Interpretation: normal    Rate:     ECG rate assessment: normal    Rhythm:     Rhythm: sinus rhythm    Ectopy:     Ectopy: none    QRS:     QRS axis:  Normal    QRS intervals:  Normal  Conduction:     Conduction: normal    ST segments:     ST segments:  Normal  T waves:     T waves: normal             Phone Contacts  ED Phone Contact    ED Course  ED Course                                MDM  Number of Diagnoses or Management Options  Bandemia: new and requires workup  Right lower lobe pneumonia [facility]): new and requires workup  Diagnosis management comments: 49-year-old female presentsEmergency department with a history of asthma  Differential diagnosis includes asthma exacerbation, pneumonia, pneumothorax, atelectasis, bronchitis, x-ray done in the emergency department shows possible development of right lower lobe infiltrate  Empiric treatment with antibiotics at this point time plan outpatient management followup given strict instructions when to return back to the emergency department  Pt re-examined and evaluated after testing and treatment  Spoke with the patient and feeling improved and sxs have resolved  Will discharge home with close f/u with pcp and instructed to return to the ED if sxs worsen or continue  Pt agrees with the plan for discharge and feels comfortable to go home with proper f/u  Advised to return for worsening or additional problems  Diagnostic tests were reviewed and questions answered  Diagnosis, care plan and treatment options were discussed  The patient understand instructions and will follow up as directed           Amount and/or Complexity of Data Reviewed  Clinical lab tests: ordered and reviewed  Tests in the radiology section of CPT®: ordered and reviewed      CritCare Time    Disposition  Final diagnoses:   Right lower lobe pneumonia ([address] )   Bandemia     Time reflects when diagnosis was documented in both MDM as applicable and the Disposition within this note     Time User Action Codes Description Comment    3/20/2018 10:52 PM Justin Gtz [J18 1] Right lower lobe pneumonia ([address] )     3/20/2018 10:52 PM Justin Gtz Kemar Gamma [address] [facility]       ED Disposition     ED Disposition Condition Comment    Discharge  Heather Ruiz discharge to home/self care  Condition at discharge: Stable        Follow-up Information     Follow up With Specialties Details Why Contact Info    Priti Frazier MD Family Medicine Call in 2 days If symptoms worsen [address] [facility]  [phone]          Discharge Medication List as of 3/20/2018 10:53 PM      START taking these medications    Details   azithromycin (ZITHROMAX Z-KATE) 250 mg tablet Take 1 tablet (250 mg total) by mouth daily for 5 days Take two tabs on day one and then one tab each day for 4 days, Starting Tue 3/20/2018, Until Sun 3/25/2018, Print         CONTINUE these medications which have NOT CHANGED    Details   albuterol (PROVENTIL HFA,VENTOLIN HFA) 90 mcg/act inhaler Inhale 2 puffs every 4 (four) hours as needed for wheezing or shortness of breath (or cough)  , Starting 2/7/2016, Until Discontinued, Print      clonazePAM (KlonoPIN) 1 mg tablet Take 1 mg by mouth daily at bedtime as needed for seizures, Historical Med      fluticasone (FLONASE) 50 mcg/act nasal spray 2 sprays into each nostril daily, Historical Med      hydrochlorothiazide (HYDRODIURIL) 12 5 mg tablet Take 1 tablet (12 5 mg total) by mouth daily, Starting Wed 2/28/2018, Normal      lamoTRIgine (LaMICtal) 25 mg tablet Take 25 mg by mouth daily at bedtime, Historical Med      losartan (COZAAR) 25 mg tablet Take 25 mg by mouth , Until Discontinued, Historical Med      meclizine (ANTIVERT) 25 mg tablet Take 25 mg by mouth 3 (three) times a day as needed for dizziness  , Until Discontinued, Historical Med      Mometasone Furo-Formoterol Fum (DULERA) 200-5 MCG/ACT AERO Inhale, Historical Med      prazosin (MINIPRESS) 2 mg capsule Take 2 mg by mouth daily at bedtime, Historical Med      QUEtiapine (SEROquel) 50 mg tablet Take 50 mg by mouth daily at bedtime as needed, Historical Med           No discharge procedures on file      ED Provider  Electronically Signed by           Nerissa Ramey DO  03/21/18 [id number]

## 2018-03-21 NOTE — DISCHARGE INSTRUCTIONS
Neumonía adquirida en la comunidad   LO QUE NECESITA SABER:   La neumonía adquirida en la comunidad (NAC) es kim infección pulmonar que se contrae fuera de un hospital o de un hogar de ancianos  Kirk pulmones se inflaman y no pueden funcionar de la [place]  La neumonía adquirida en la comunidad puede ser causada por kim bacteria, un virus o un hongo  INSTRUCCIONES [facility]BRE [facility]:   Regrese a la roxann de emergencias si:   · Usted está confundido y no puede pensar con claridad  · Usted tiene más dificultad para respirar  · Kirk labios o uñas de las deshawn se tornan grises o [place]  Pregúntele a aleman [place] vitaminas y minerales son adecuados para usted  · Kirk síntomas no mejoran o [place]  · Usted está orinando menos o no Naz Parth  · Usted tiene preguntas o inquietudes acerca de aleman condición o cuidado  Medicamentos:   · Medicamentos,  para tratar kim infección bacterial, viral o causada por hongos  También podrían administrarle un medicamento para dilatar kirk bronquios y ayudarle a que respire con mayor facilidad  · [place] kirk medicamentos amrit se le haya indicado  Consulte con aleman médico si usted bryan que aleman medicamento no le está ayudando o si presenta efectos secundarios  Infórmele si es alérgico a cualquier medicamento  Mantenga kim lista actualizada de los [place], las vitaminas y los productos herbales que toya  Incluya los siguientes datos de los medicamentos: cantidad, frecuencia y motivo de administración  Traiga con usted la lista o los envases de la píldoras a kirk citas de seguimiento  Lleve la lista de los medicamentos con usted en alok de kim emergencia  Programe kim epi con aleman médico dentro de 3 días o según indicaciones:  Es posible que deban tomarle otra radiografía  Anote kirk preguntas para que se acuerde de hacerlas renata kirk visitas  Tos y respiración profunda:  La respiración profunda ayuda a abrir las vía aéreas de kirk pulmones   El toser [place] a expulsar las flemas de kirk pulmones  Respire hondo y contenga el aliento tanto amrit pueda  Luego expulse el aire de kirk pulmones con kim tos o expectoración qing y profunda  Expectore la flema  [place] 10 inhalaciones profundas seguidas, kim detrás de la [place], cada hora que usted esté despierto  Recuerde toser después de hacer kim inhalación profunda  No fume ni permita que otras personas fumen a aleman alrededor:  La nicotina y otras sustancias químicas que contienen los cigarrillos y cigarros pueden dañar los pulmones  Pida información a aleman médico si usted actualmente fuma y necesita ayuda para dejar de fumar  Los cigarrillos electrónicos o tabaco sin humo todavía contienen nicotina  Consulte con aleman médico antes de [employer]  Mantenga aleman neumonía adquirida en la comunidad bajo control en aleman hogar:   · Respire aire cálido y húmedo  [place] ayuda a aflojar la flema  Coloque sin presionar kim toalla pequeña tibia y húmeda sobre aleman [place] and [place] y aleman boca  Un humidificador de [employer] puede poner humedad en el aire  · [address] se le haya indicado  Pregunte a aleman médico cuánto líquido debe saurabh a diario y qué líquidos le recomienda  Los líquidos ayudan a disolver la flema y expulsarla de aleman cuerpo  · Dese golpecitos suaves en el pecho  [place] ayuda a aflojar la flema y hace que sea más fácil toser  Acuéstese boca arriba con la hayden más abajo que aleman pecho varias veces al día y golpéese con suavidad el pecho  · Descanse lo suficiente  El descanso ayuda a que aleman cuerpo se recupere  Prevenir el CAP:   · [place] frecuentemente con agua y Albert  Lleve un gel antibacterial con usted  Usted puede usar el gel para limpiar kirk deshawn cuando no William Dharmesh y agua disponibles  No se toque los ojos, la nariz o la boca a menos que se haya lavado las deshawn saqib  · Limpie las superficies con frecuencia    [address] yumiko, los muebles de la cocina, teléfonos celulares y otras superficies que la gente toca con frecuencia  · Siempre cubra allison boca al toser  Tosa en un pañuelo desechable o en la manga de allison camisa para no contagiar los gérmenes con tasha deshawn  · Trate de evitar a las personas que están resfriadas o tienen gripe  Si usted está enfermo, manténgase alejado de otras personas lo más que pueda  · [facility] vacunas  Es posible que usted necesite recibir kim vacuna que sirve para prevenir la neumonía  Acuda a que le apliquen la vacuna de la influenza (gripe) cada año tan pronto amrit esté disponible  © 2017 [address] Information is for End User's use only and may not be sold, redistributed or otherwise used for commercial purposes  All illustrations and images included in CareNotes® are the copyrighted property of A D A M , Inc  or Darian Ferrari  Esta información es sólo para uso en educación  Allison intención no es darle un consejo médico sobre enfermedades o tratamientos  Colsulte con allison Ladena Creed farmacéutico antes de seguir cualquier régimen médico para saber si es seguro y efectivo para usted  Neumonía adquirida en la comunidad   LO QUE NECESITA SABER:   La neumonía adquirida en la comunidad (NAC) es kim infección pulmonar que se contrae fuera de un hospital o de un hogar de ancianos  Tasha pulmones se inflaman y no pueden funcionar de la [place]  La neumonía adquirida en la comunidad puede ser causada por kim bacteria, un virus o un hongo  INSTRUCCIONES [facility]BRE [facility]ITAL[facility]IA:   Busque atención médica de inmediato si:   · Usted está confundido y no puede pensar con claridad  · Usted tiene más dificultad para respirar  · Tasha labios o uñas de las deshawn se tornan grises o [place]  Pregúntele a allison Reyne Rave vitaminas y minerales son adecuados para usted  · Tasha síntomas no mejoran o [facility]  · Usted está orinando menos o no [facility]      · Usted tiene preguntas o inquietudes acerca de allison [place]  Medicamentos:   · Medicamentos,  para tratar kim infección bacterial, viral o causada por hongos  También podrían administrarle un medicamento para dilatar kirk bronquios y ayudarle a que respire con mayor facilidad  · [place] kirk medicamentos amrit se le haya indicado  Consulte con aleman médico si usted bryan que aleman medicamento no le está ayudando o si presenta efectos secundarios  Infórmele si es alérgico a cualquier medicamento  Mantenga kim lista actualizada de los [place], las vitaminas y los productos herbales que toya  Incluya los siguientes datos de los medicamentos: cantidad, frecuencia y motivo de administración  Traiga con usted la lista o los envases de la píldoras a kirk citas de seguimiento  Lleve la lista de los medicamentos con usted en alok de kim emergencia  Programe kim epi con aleman médico dentro de 3 días o según indicaciones:  Es posible que deban tomarle otra radiografía  Anote kirk preguntas para que se acuerde de hacerlas renata kirk visitas  Tos y respiración profunda:  La respiración profunda ayuda a abrir las vía aéreas de kirk pulmones  El toser [place] [employer] de kirk pulmones  Respire hondo y contenga el aliento tanto amrit pueda  Luego expulse el aire de kirk pulmones con kim tos o expectoración qing y profunda  Expectore la flema  [place] 10 inhalaciones profundas seguidas, kim detrás de la [place], cada hora que usted esté despierto  Recuerde toser después de hacer kim inhalación profunda  No fume ni permita que otras personas fumen a aleman alrededor:  La nicotina y otras sustancias químicas que contienen los cigarrillos y cigarros pueden dañar los pulmones  Pida información a aleman médico si usted actualmente fuma y necesita ayuda para dejar de fumar  Los cigarrillos electrónicos o tabaco sin humo todavía contienen nicotina  Consulte con aleman médico antes de [employer]     Mantenga aleman neumonía adquirida en la comunidad bajo control en aleman hogar: · Respire aire cálido y húmedo  [facility] ayuda a aflojar la flema  Coloque sin presionar kim toalla pequeña tibia y húmeda sobre aleman Priscella Comfort y aleman boca  Un humidificador de [employer] puede poner humedad en el aire  · [address] se le haya indicado  Pregunte a aleman médico cuánto líquido debe saurabh a diario y qué líquidos le recomienda  Los líquidos ayudan a disolver la flema y expulsarla de aleman cuerpo  · Dese golpecitos suaves en el pecho  [facility] ayuda a aflojar la flema y hace que sea más fácil toser  Acuéstese boca arriba con la hayden más abajo que aleman pecho varias veces al día y golpéese con suavidad el pecho  · Descanse lo suficiente  El descanso ayuda a que aleman cuerpo se recupere  Prevenir el CAP:   · [employer] frecuentemente con agua y Albert  Lleve un gel antibacterial con usted  Usted puede usar el gel para limpiar kirk deshawn cuando no Arthor Matsu y agua disponibles  No se toque los ojos, la nariz o la boca a menos que se haya lavado las deshawn saqib  · Limpie las superficies con frecuencia  Limpie las perillas de las yumiko, los muebles de la cocina, teléfonos celulares y otras superficies que la gente toca con frecuencia  · Siempre cubra aleman boca al toser  Tosa en un pañuelo desechable o en la manga de aleman camisa para no contagiar los gérmenes con kirk deshawn  · Trate de evitar a las personas que están resfriadas o tienen gripe  Si usted está enfermo, manténgase alejado de otras personas lo más que pueda  · [place] vacunas  Es posible que usted necesite recibir kim vacuna que sirve para prevenir la neumonía  Acuda a que le apliquen la vacuna de la influenza (gripe) cada año tan pronto amrit esté disponible  © 2017 [address] Information is for End User's use only and may not be sold, redistributed or otherwise used for commercial purposes   All illustrations and images included in CareNotes® are the copyrighted property of [employer]  or Darian Vonda  Esta información es sólo para uso en educación  Aleman intención no es darle un consejo médico sobre enfermedades o tratamientos  Colsulte con aleman [place] farmacéutico antes de seguir cualquier régimen médico para saber si es seguro y efectivo para usted

## 2018-04-19 DIAGNOSIS — I10 HYPERTENSION, UNSPECIFIED TYPE: ICD-10-CM

## 2018-04-19 DIAGNOSIS — J45.909 ASTHMA, UNSPECIFIED ASTHMA SEVERITY, UNSPECIFIED WHETHER COMPLICATED, UNSPECIFIED WHETHER PERSISTENT: Primary | ICD-10-CM

## 2018-04-23 DIAGNOSIS — I10 ESSENTIAL HYPERTENSION: Primary | ICD-10-CM

## 2018-04-23 RX ORDER — LOSARTAN POTASSIUM 25 MG/1
25 TABLET ORAL DAILY
Qty: 30 TABLET | Refills: 4 | Status: SHIPPED | OUTPATIENT
Start: 2018-04-23 | End: 2018-10-15

## 2018-05-17 DIAGNOSIS — J45.20 MILD INTERMITTENT ASTHMA WITHOUT COMPLICATION: Primary | ICD-10-CM

## 2018-05-21 RX ORDER — LOSARTAN POTASSIUM 50 MG/1
TABLET ORAL
Qty: 180 TABLET | Refills: 1 | Status: SHIPPED | OUTPATIENT
Start: 2018-05-21 | End: 2018-10-15

## 2018-05-21 RX ORDER — ALBUTEROL SULFATE 90 UG/1
2 AEROSOL, METERED RESPIRATORY (INHALATION) EVERY 4 HOURS PRN
Qty: 18 G | Refills: 3 | Status: SHIPPED | OUTPATIENT
Start: 2018-05-21 | End: 2021-06-04

## 2018-10-15 ENCOUNTER — OFFICE VISIT (OUTPATIENT)
Dept: FAMILY MEDICINE CLINIC | Facility: CLINIC | Age: 43
End: 2018-10-15
Payer: COMMERCIAL

## 2018-10-15 VITALS
WEIGHT: 131.2 LBS | BODY MASS INDEX: 23.24 KG/M2 | SYSTOLIC BLOOD PRESSURE: 102 MMHG | TEMPERATURE: 97.7 F | DIASTOLIC BLOOD PRESSURE: 68 MMHG | HEART RATE: 65 BPM | OXYGEN SATURATION: 100 %

## 2018-10-15 DIAGNOSIS — Z12.31 SCREENING MAMMOGRAM, ENCOUNTER FOR: ICD-10-CM

## 2018-10-15 DIAGNOSIS — I10 HTN (HYPERTENSION), BENIGN: Primary | ICD-10-CM

## 2018-10-15 DIAGNOSIS — Z23 NEED FOR INFLUENZA VACCINATION: ICD-10-CM

## 2018-10-15 PROCEDURE — 99213 OFFICE O/P EST LOW 20 MIN: CPT | Performed by: FAMILY MEDICINE

## 2018-10-15 PROCEDURE — 90686 IIV4 VACC NO PRSV 0.5 ML IM: CPT | Performed by: FAMILY MEDICINE

## 2018-10-15 PROCEDURE — 90471 IMMUNIZATION ADMIN: CPT | Performed by: FAMILY MEDICINE

## 2018-10-15 RX ORDER — TELMISARTAN AND HYDROCHLORTHIAZIDE 80; 25 MG/1; MG/1
1 TABLET ORAL DAILY
Qty: 90 TABLET | Refills: 1 | Status: SHIPPED | OUTPATIENT
Start: 2018-10-15 | End: 2020-09-01 | Stop reason: SDUPTHER

## 2018-10-15 RX ORDER — CITALOPRAM 40 MG/1
1 TABLET ORAL DAILY
COMMUNITY
Start: 2016-02-11 | End: 2018-10-15

## 2018-10-15 RX ORDER — TELMISARTAN 80 MG/1
40 TABLET ORAL DAILY
COMMUNITY
End: 2018-10-15

## 2018-10-15 RX ORDER — OXCARBAZEPINE 300 MG/1
300 TABLET, FILM COATED ORAL
COMMUNITY

## 2018-10-15 RX ORDER — VERAPAMIL HYDROCHLORIDE 240 MG/1
1 CAPSULE, EXTENDED RELEASE ORAL DAILY
COMMUNITY
Start: 2015-12-15 | End: 2018-10-15

## 2018-10-15 RX ORDER — TELMISARTAN AND HYDROCHLORTHIAZIDE 80; 25 MG/1; MG/1
1 TABLET ORAL DAILY
COMMUNITY
End: 2018-10-15 | Stop reason: SDUPTHER

## 2018-10-15 NOTE — PROGRESS NOTES
Assessment/Plan:     HTN (hypertension), benign  -     telmisartan-hydrochlorothiazide (MICARDIS HCT) 80-25 MG per tablet; Take 1 tablet by mouth daily  -     Comprehensive metabolic panel; Future  -     Lipid panel; Future    Screening mammogram, encounter for  -     Mammo screening bilateral w cad; Future    Need for influenza vaccination  -     SYRINGE/SINGLE-DOSE VIAL: influenza vaccine, [id number], quadrivalent, 0 5 mL, preservative-free, for patients 3+ yr (FLUZONE)    Other orders  -     OXcarbazepine (TRILEPTAL) 300 mg tablet; Take 300 mg by mouth daily at bedtime    Patient doing stable at this time  She had a benign physical exam   Her vitals were stable  I recommended that she continue the Micardis at the 40-12 5 mg dosing and she can continue the packet she has, until she runs out and needs refills  It appeared to be that [facility] had the cheapest pricing on Micardis for when she needs it next time  Her blood pressure is stable, but if she feels lightheaded or dizzy I want her to call our office, and not take her blood pressure medication  She should continue checking her blood pressures at home  Return to clinic with any questions  Subjective:   Patient ID: Sadie Wolff is a 43 y o  female  HPI  Pt here today to discuss medications  She is from [facility], visited last yr and takes micardis -plus for her HTN  Pt used to have BP's in the 197'V systolic while sleeping and having nightmares  Pt sees family guidance for bipolar, and anxiety with nightmares and PTSD  Pt's mother had HTN & father had depression  Pt feels stressed with anxiety  Pt will hold her micardis if her BP is low or she feels lightheaded or dizzy  Patient's blood pressure is well controlled on this regimen  She takes the Micardis 80-25 mg,  And takes 1/2 tablet per day,   With 40-12 5 mg  She does not want to change her medications       Patient overdue for her mammogram   Last 1 was 12/12/16 and was BI-RADS 1- bilaterally  Review of Systems   Constitutional: Negative for chills and fever  HENT: Negative for congestion, postnasal drip, sinus pain and sore throat  Respiratory: Negative for cough, choking, chest tightness, shortness of breath and wheezing  Cardiovascular: Negative for chest pain, palpitations and leg swelling  Gastrointestinal: Negative for abdominal pain, constipation, diarrhea, nausea and vomiting  Genitourinary: Negative for dysuria and urgency  Musculoskeletal: Negative for arthralgias and back pain  Neurological: Negative for dizziness, tremors, light-headedness, numbness and headaches  Psychiatric/Behavioral: Negative for decreased concentration  The patient is not nervous/anxious  Objective:  Vitals:    10/15/18 1511   BP: 102/68   Pulse: 65   Temp: 97 7 °F (36 5 °C)   SpO2: 100%      Physical Exam   Constitutional: She is oriented to person, place, and time  She appears well-developed and well-nourished  No distress  HENT:   Head: Normocephalic and atraumatic  Nose: Nose normal    Eyes: Right eye exhibits no discharge  Left eye exhibits no discharge  No scleral icterus  Neck: Normal range of motion  Neck supple  Cardiovascular: Normal rate, regular rhythm, normal heart sounds and intact distal pulses  Exam reveals no gallop and no friction rub  No murmur heard  Pulmonary/Chest: Effort normal and breath sounds normal  No stridor  No respiratory distress  She has no wheezes  She has no rales  She exhibits no tenderness  Musculoskeletal: Normal range of motion  She exhibits no edema, tenderness or deformity  Neurological: She is alert and oriented to person, place, and time  Skin: Skin is warm and dry  No rash noted  She is not diaphoretic  No erythema  No pallor  Psychiatric: She has a normal mood and affect  Her behavior is normal  Judgment and thought content normal    Vitals reviewed

## 2018-11-16 ENCOUNTER — APPOINTMENT (OUTPATIENT)
Dept: LAB | Facility: HOSPITAL | Age: 43
End: 2018-11-16
Payer: COMMERCIAL

## 2018-11-16 ENCOUNTER — TRANSCRIBE ORDERS (OUTPATIENT)
Dept: ADMINISTRATIVE | Facility: HOSPITAL | Age: 43
End: 2018-11-16

## 2018-11-16 DIAGNOSIS — I10 HTN (HYPERTENSION), BENIGN: ICD-10-CM

## 2018-11-16 LAB
ALBUMIN SERPL BCP-MCNC: 3.6 G/DL (ref 3.5–5)
ALP SERPL-CCNC: 58 U/L (ref 46–116)
ALT SERPL W P-5'-P-CCNC: 36 U/L (ref 12–78)
ANION GAP SERPL CALCULATED.3IONS-SCNC: 7 MMOL/L (ref 4–13)
AST SERPL W P-5'-P-CCNC: 23 U/L (ref 5–45)
BILIRUB SERPL-MCNC: 0.3 MG/DL (ref 0.2–1)
BUN SERPL-MCNC: 19 MG/DL (ref 5–25)
CALCIUM SERPL-MCNC: 8.8 MG/DL (ref 8.3–10.1)
CHLORIDE SERPL-SCNC: 103 MMOL/L (ref 100–108)
CHOLEST SERPL-MCNC: 162 MG/DL (ref 50–200)
CO2 SERPL-SCNC: 26 MMOL/L (ref 21–32)
CREAT SERPL-MCNC: 0.78 MG/DL (ref 0.6–1.3)
GFR SERPL CREATININE-BSD FRML MDRD: 94 ML/MIN/1.73SQ M
GLUCOSE P FAST SERPL-MCNC: 88 MG/DL (ref 65–99)
HDLC SERPL-MCNC: 77 MG/DL (ref 40–60)
LDLC SERPL CALC-MCNC: 80 MG/DL (ref 0–100)
NONHDLC SERPL-MCNC: 85 MG/DL
POTASSIUM SERPL-SCNC: 3.7 MMOL/L (ref 3.5–5.3)
PROT SERPL-MCNC: 6.9 G/DL (ref 6.4–8.2)
SODIUM SERPL-SCNC: 136 MMOL/L (ref 136–145)
TRIGL SERPL-MCNC: 26 MG/DL

## 2018-11-16 PROCEDURE — 80053 COMPREHEN METABOLIC PANEL: CPT

## 2018-11-16 PROCEDURE — 80061 LIPID PANEL: CPT

## 2018-11-16 PROCEDURE — 36415 COLL VENOUS BLD VENIPUNCTURE: CPT

## 2018-11-29 ENCOUNTER — HOSPITAL ENCOUNTER (OUTPATIENT)
Dept: RADIOLOGY | Facility: HOSPITAL | Age: 43
Discharge: HOME | End: 2018-11-29
Payer: COMMERCIAL

## 2018-11-29 VITALS — BODY MASS INDEX: 22.2 KG/M2 | WEIGHT: 130 LBS | HEIGHT: 64 IN

## 2018-11-29 DIAGNOSIS — Z12.31 SCREENING MAMMOGRAM, ENCOUNTER FOR: ICD-10-CM

## 2018-11-29 PROCEDURE — 77063 BREAST TOMOSYNTHESIS BI: CPT

## 2018-11-29 PROCEDURE — 77067 SCR MAMMO BI INCL CAD: CPT

## 2020-09-01 DIAGNOSIS — I10 HTN (HYPERTENSION), BENIGN: ICD-10-CM

## 2020-09-02 RX ORDER — TELMISARTAN AND HYDROCHLORTHIAZIDE 80; 25 MG/1; MG/1
1 TABLET ORAL DAILY
Qty: 90 TABLET | Refills: 1 | Status: SHIPPED | OUTPATIENT
Start: 2020-09-02 | End: 2021-05-16

## 2021-01-17 ENCOUNTER — APPOINTMENT (EMERGENCY)
Dept: RADIOLOGY | Facility: HOSPITAL | Age: 46
End: 2021-01-17
Payer: COMMERCIAL

## 2021-01-17 ENCOUNTER — HOSPITAL ENCOUNTER (EMERGENCY)
Facility: HOSPITAL | Age: 46
Discharge: HOME | End: 2021-01-17
Attending: GENERAL PRACTICE
Payer: COMMERCIAL

## 2021-01-17 VITALS
HEART RATE: 69 BPM | BODY MASS INDEX: 24.34 KG/M2 | OXYGEN SATURATION: 98 % | TEMPERATURE: 99 F | RESPIRATION RATE: 20 BRPM | SYSTOLIC BLOOD PRESSURE: 103 MMHG | DIASTOLIC BLOOD PRESSURE: 68 MMHG | WEIGHT: 141.8 LBS

## 2021-01-17 DIAGNOSIS — S00.01XA ABRASION OF SCALP, INITIAL ENCOUNTER: ICD-10-CM

## 2021-01-17 DIAGNOSIS — S06.0X0A CONCUSSION WITHOUT LOSS OF CONSCIOUSNESS, INITIAL ENCOUNTER: Primary | ICD-10-CM

## 2021-01-17 PROCEDURE — 96372 THER/PROPH/DIAG INJ SC/IM: CPT

## 2021-01-17 PROCEDURE — 90715 TDAP VACCINE 7 YRS/> IM: CPT | Performed by: GENERAL PRACTICE

## 2021-01-17 PROCEDURE — 99283 EMERGENCY DEPT VISIT LOW MDM: CPT

## 2021-01-17 PROCEDURE — 72125 CT NECK SPINE W/O DYE: CPT

## 2021-01-17 PROCEDURE — 90471 IMMUNIZATION ADMIN: CPT

## 2021-01-17 PROCEDURE — 99284 EMERGENCY DEPT VISIT MOD MDM: CPT | Performed by: GENERAL PRACTICE

## 2021-01-17 PROCEDURE — G1004 CDSM NDSC: HCPCS

## 2021-01-17 PROCEDURE — 70450 CT HEAD/BRAIN W/O DYE: CPT

## 2021-01-17 RX ORDER — ACETAMINOPHEN 325 MG/1
975 TABLET ORAL ONCE
Status: COMPLETED | OUTPATIENT
Start: 2021-01-17 | End: 2021-01-17

## 2021-01-17 RX ORDER — KETOROLAC TROMETHAMINE 30 MG/ML
30 INJECTION, SOLUTION INTRAMUSCULAR; INTRAVENOUS ONCE
Status: COMPLETED | OUTPATIENT
Start: 2021-01-17 | End: 2021-01-17

## 2021-01-17 RX ADMIN — KETOROLAC TROMETHAMINE 30 MG: 30 INJECTION, SOLUTION INTRAMUSCULAR at 15:19

## 2021-01-17 RX ADMIN — TETANUS TOXOID, REDUCED DIPHTHERIA TOXOID AND ACELLULAR PERTUSSIS VACCINE, ADSORBED 0.5 ML: 5; 2.5; 8; 8; 2.5 SUSPENSION INTRAMUSCULAR at 14:55

## 2021-01-17 RX ADMIN — ACETAMINOPHEN 975 MG: 325 TABLET, FILM COATED ORAL at 14:55

## 2021-01-17 NOTE — ED PROVIDER NOTES
History  Chief Complaint   Patient presents with   Emaline Folds Fall     States she was playing inside on a skateboard and fell backwards striking head on door 10 pm yesterday  Denies LOC  States had bleeding scalp, feels dizzy has headache  States neck pain, ambu collar applied  Denies other injuries     Head Injury    Neck Injury     Patient is a 66-year-old female with a past medical history of hypertension who presents to the emergency room with head injury  States she was playing on her son's skateboard yesterday when she fell backwards and landed on the back of her head  She had a wound with bleeding that stops spontaneously  Since then she has felt like resting and has symptoms including headache, dizziness, increased fatigue, difficulty concentrating, and nausea without vomiting  She is also complaining of neck pain  No other injuries  She had no loss of consciousness  Fall  Associated symptoms: headaches    Associated symptoms: no abdominal pain, no chest pain, no nausea and no vomiting    Neck Injury  Associated symptoms: headaches    Associated symptoms: no abdominal pain, no chest pain, no congestion, no diarrhea, no fever, no myalgias, no nausea, no rhinorrhea, no shortness of breath, no sore throat, no vomiting and no wheezing        Prior to Admission Medications   Prescriptions Last Dose Informant Patient Reported? Taking?    Mometasone Furo-Formoterol Fum (DULERA) 200-5 MCG/ACT AERO   Yes No   Sig: Inhale   OXcarbazepine (TRILEPTAL) 300 mg tablet  Self Yes No   Sig: Take 300 mg by mouth daily at bedtime   VENTOLIN  (90 Base) MCG/ACT inhaler   No No   Sig: INHALE ONE TO TWO PUFFS BY MOUTH EVERY 4 TO 6 HOURS AS NEEDED   albuterol (PROVENTIL HFA,VENTOLIN HFA) 90 mcg/act inhaler   No No   Sig: Inhale 2 puffs every 4 (four) hours as needed for wheezing or shortness of breath (or cough)   Patient not taking: Reported on 10/15/2018    clonazePAM (KlonoPIN) 1 mg tablet   Yes No   Sig: Take 1 mg by mouth daily at bedtime as needed for seizures   fluticasone (FLONASE) 50 mcg/act nasal spray   Yes No   Si sprays into each nostril daily   fluticasone-salmeterol (ADVAIR DISKUS) 250-50 mcg/dose inhaler   Yes No   Sig: Inhale 1 puff 2 (two) times a day   hyoscyamine (LEVSIN/SL) 0 125 mg SL tablet   Yes No   Sig: Place under the tongue 4 times daily   meclizine (ANTIVERT) 25 mg tablet   Yes No   Sig: Take 25 mg by mouth 3 (three) times a day as needed for dizziness  prazosin (MINIPRESS) 2 mg capsule  Self Yes No   Sig: Take 5 mg by mouth daily at bedtime     telmisartan-hydrochlorothiazide (MICARDIS HCT) 80-25 MG per tablet   No No   Sig: Take 1 tablet by mouth daily      Facility-Administered Medications: None       Past Medical History:   Diagnosis Date    Anxiety     Asthma     Depression     Hypertension        Past Surgical History:   Procedure Laterality Date    CHOLECYSTECTOMY         Family History   Problem Relation Age of Onset    Hypertension Mother     Diabetes Father     Pulmonary embolism Father      I have reviewed and agree with the history as documented  E-Cigarette/Vaping    E-Cigarette Use Never User      E-Cigarette/Vaping Substances     Social History     Tobacco Use    Smoking status: Never Smoker    Smokeless tobacco: Never Used   Substance Use Topics    Alcohol use: Yes     Frequency: Monthly or less     Drinks per session: 1 or 2     Binge frequency: Never    Drug use: No       Review of Systems   Constitutional: Negative for chills, diaphoresis and fever  HENT: Negative for congestion, rhinorrhea and sore throat  Eyes: Negative for redness and visual disturbance  Respiratory: Negative for shortness of breath and wheezing  Cardiovascular: Negative for chest pain and palpitations  Gastrointestinal: Negative for abdominal pain, constipation, diarrhea, nausea and vomiting  Endocrine: Negative for cold intolerance and heat intolerance     Genitourinary: Negative for dysuria and hematuria  Musculoskeletal: Negative for arthralgias and myalgias  Neurological: Positive for dizziness, light-headedness and headaches  Negative for facial asymmetry and numbness  Hematological: Negative for adenopathy  Does not bruise/bleed easily  Psychiatric/Behavioral: Negative for dysphoric mood  The patient is not nervous/anxious  Physical Exam  Physical Exam  Vitals signs and nursing note reviewed  Constitutional:       General: She is not in acute distress  Appearance: Normal appearance  She is not ill-appearing  HENT:      Head: Normocephalic and atraumatic  Mouth/Throat:      Mouth: Mucous membranes are moist       Pharynx: Oropharynx is clear  Eyes:      General: No scleral icterus  Conjunctiva/sclera: Conjunctivae normal    Neck:      Musculoskeletal: Normal range of motion and neck supple  Cardiovascular:      Rate and Rhythm: Normal rate and regular rhythm  Pulses: Normal pulses  Heart sounds: Normal heart sounds  No murmur  No friction rub  No gallop  Pulmonary:      Effort: Pulmonary effort is normal  No respiratory distress  Breath sounds: Normal breath sounds  No wheezing, rhonchi or rales  Abdominal:      Palpations: Abdomen is soft  Tenderness: There is no abdominal tenderness  Musculoskeletal:         General: No swelling or tenderness  Cervical back: She exhibits bony tenderness  Thoracic back: She exhibits no tenderness  Lumbar back: She exhibits no tenderness  Skin:     General: Skin is warm and dry  Capillary Refill: Capillary refill takes less than 2 seconds  Neurological:      General: No focal deficit present  Mental Status: She is alert  Mental status is at baseline  GCS: GCS eye subscore is 4  GCS verbal subscore is 5  GCS motor subscore is 6  Cranial Nerves: Cranial nerves are intact  Sensory: Sensation is intact  Motor: Motor function is intact  Coordination: Coordination is intact  Gait: Gait is intact  Psychiatric:         Mood and Affect: Mood normal          Behavior: Behavior normal          Vital Signs  ED Triage Vitals   Temperature Pulse Respirations Blood Pressure SpO2   01/17/21 1310 01/17/21 1310 01/17/21 1310 01/17/21 1310 01/17/21 1310   99 °F (37 2 °C) 69 20 103/68 98 %      Temp Source Heart Rate Source Patient Position - Orthostatic VS BP Location FiO2 (%)   01/17/21 1310 01/17/21 1310 01/17/21 1310 01/17/21 1310 --   Tympanic Monitor Sitting Left arm       Pain Score       01/17/21 1455       8           Vitals:    01/17/21 1310   BP: 103/68   Pulse: 69   Patient Position - Orthostatic VS: Sitting         Visual Acuity      ED Medications  Medications   ketorolac (TORADOL) injection 30 mg (has no administration in time range)   tetanus-diphtheria-acellular pertussis (BOOSTRIX) IM injection 0 5 mL (0 5 mL Intramuscular Given 1/17/21 1455)   acetaminophen (TYLENOL) tablet 975 mg (975 mg Oral Given 1/17/21 1455)       Diagnostic Studies  Results Reviewed     None                 CT head without contrast   Final Result by Angie Aguila MD (01/17 4043)      Normal CT of the head  Workstation performed: [id number]         CT spine cervical without contrast   Final Result by Angie Aguila MD (01/17 8956)       No cervical spine fracture or traumatic malalignment  Workstation performed: [id number]                    Procedures  Procedures         ED Course  ED Course as of Jan 17 1509   Juma Blanton Jan 17, 2021   1506 Patient feeling better  CT's negative for acute injuries  C collar removed  Toradol ordered  Will discharge in stable condition                                                 MDM    Disposition  Final diagnoses:   Concussion without loss of consciousness, initial encounter   Abrasion of scalp, initial encounter     Time reflects when diagnosis was documented in both MDM as applicable and the Disposition within this note     Time User Action Codes Description Comment    1/17/2021  3:07 PM Dora Adair Add [S06 [id number]] Concussion without loss of consciousness, initial encounter     1/17/2021  3:08 PM Dora Adair Add [S00 [id number]] Abrasion of scalp, initial encounter       ED Disposition     ED Disposition Condition Date/Time Comment    Discharge Stable Sun Jan 17, 2021  3:07 PM Nishi Vasquez discharge to home/self care  Follow-up Information     Follow up With Specialties Details Why [address]  Schedule an appointment as soon as possible for a visit   [place] [id number]          Patient's Medications   Discharge Prescriptions    No medications on file     No discharge procedures on file      PDMP Review     None          ED Provider  Electronically Signed by           Ren Mitchell MD  01/17/21 2430

## 2021-01-17 NOTE — Clinical Note
Lis Duenas was seen and treated in our emergency department on 1/17/2021  Diagnosis:     Michelle Myles  may return to work on return date  She may return on this date: 01/20/2021         If you have any questions or concerns, please don't hesitate to call        Maria Dolores Gamino MD    ______________________________           _______________          _______________  Hospital Representative                              Date                                Time

## 2021-04-01 ENCOUNTER — IMMUNIZATIONS (OUTPATIENT)
Dept: FAMILY MEDICINE CLINIC | Facility: HOSPITAL | Age: 46
End: 2021-04-01

## 2021-04-01 DIAGNOSIS — Z23 ENCOUNTER FOR IMMUNIZATION: Primary | ICD-10-CM

## 2021-04-01 PROCEDURE — 0011A SARS-COV-2 / COVID-19 MRNA VACCINE (MODERNA) 100 MCG: CPT

## 2021-04-01 PROCEDURE — 91301 SARS-COV-2 / COVID-19 MRNA VACCINE (MODERNA) 100 MCG: CPT

## 2021-05-04 ENCOUNTER — IMMUNIZATIONS (OUTPATIENT)
Dept: FAMILY MEDICINE CLINIC | Facility: HOSPITAL | Age: 46
End: 2021-05-04

## 2021-05-04 DIAGNOSIS — Z23 ENCOUNTER FOR IMMUNIZATION: Primary | ICD-10-CM

## 2021-05-04 PROCEDURE — 91301 SARS-COV-2 / COVID-19 MRNA VACCINE (MODERNA) 100 MCG: CPT

## 2021-05-04 PROCEDURE — 0012A SARS-COV-2 / COVID-19 MRNA VACCINE (MODERNA) 100 MCG: CPT

## 2021-05-16 DIAGNOSIS — I10 HTN (HYPERTENSION), BENIGN: ICD-10-CM

## 2021-05-16 RX ORDER — TELMISARTAN AND HYDROCHLORTHIAZIDE 80; 25 MG/1; MG/1
1 TABLET ORAL DAILY
Qty: 15 TABLET | Refills: 0 | Status: SHIPPED | OUTPATIENT
Start: 2021-05-16 | End: 2021-06-04 | Stop reason: SDUPTHER

## 2021-05-28 ENCOUNTER — RA CDI HCC (OUTPATIENT)
Dept: OTHER | Facility: HOSPITAL | Age: 46
End: 2021-05-28

## 2021-06-04 ENCOUNTER — OFFICE VISIT (OUTPATIENT)
Dept: FAMILY MEDICINE CLINIC | Facility: CLINIC | Age: 46
End: 2021-06-04
Payer: COMMERCIAL

## 2021-06-04 VITALS
TEMPERATURE: 98.9 F | HEART RATE: 75 BPM | DIASTOLIC BLOOD PRESSURE: 72 MMHG | SYSTOLIC BLOOD PRESSURE: 120 MMHG | WEIGHT: 146.6 LBS | RESPIRATION RATE: 18 BRPM | OXYGEN SATURATION: 98 % | HEIGHT: 61 IN | BODY MASS INDEX: 27.68 KG/M2

## 2021-06-04 DIAGNOSIS — E66.3 OVERWEIGHT (BMI 25.0-29.9): ICD-10-CM

## 2021-06-04 DIAGNOSIS — J45.20 MILD INTERMITTENT ASTHMA WITHOUT COMPLICATION: ICD-10-CM

## 2021-06-04 DIAGNOSIS — Z12.39 SCREENING BREAST EXAMINATION: ICD-10-CM

## 2021-06-04 DIAGNOSIS — I10 HTN (HYPERTENSION), BENIGN: ICD-10-CM

## 2021-06-04 DIAGNOSIS — Z00.00 ANNUAL PHYSICAL EXAM: Primary | ICD-10-CM

## 2021-06-04 DIAGNOSIS — F33.1 MODERATE EPISODE OF RECURRENT MAJOR DEPRESSIVE DISORDER (HCC): ICD-10-CM

## 2021-06-04 PROCEDURE — 3008F BODY MASS INDEX DOCD: CPT | Performed by: FAMILY MEDICINE

## 2021-06-04 PROCEDURE — 3725F SCREEN DEPRESSION PERFORMED: CPT | Performed by: FAMILY MEDICINE

## 2021-06-04 PROCEDURE — 1036F TOBACCO NON-USER: CPT | Performed by: FAMILY MEDICINE

## 2021-06-04 PROCEDURE — 99396 PREV VISIT EST AGE 40-64: CPT | Performed by: FAMILY MEDICINE

## 2021-06-04 RX ORDER — ALBUTEROL SULFATE 90 UG/1
1-2 AEROSOL, METERED RESPIRATORY (INHALATION) EVERY 6 HOURS PRN
Qty: 18 G | Refills: 2 | Status: SHIPPED | OUTPATIENT
Start: 2021-06-04

## 2021-06-04 RX ORDER — TELMISARTAN AND HYDROCHLORTHIAZIDE 80; 25 MG/1; MG/1
1 TABLET ORAL DAILY
Qty: 90 TABLET | Refills: 2 | Status: SHIPPED | OUTPATIENT
Start: 2021-06-04 | End: 2022-03-01

## 2021-06-04 NOTE — PATIENT INSTRUCTIONS

## 2021-06-04 NOTE — PROGRESS NOTES
[address] [facility]    NAME: Serene Corona  AGE: 39 y o  SEX: female  : 1975     DATE: 2021     Assessment and Plan:     Problem List Items Addressed This Visit        Respiratory    Mild intermittent asthma without complication    Relevant Medications    albuterol (Ventolin HFA) 90 mcg/act inhaler    fluticasone-salmeterol (Advair Diskus) 250-50 mcg/dose inhaler       Cardiovascular and Mediastinum    HTN (hypertension), benign    Relevant Medications    telmisartan-hydrochlorothiazide (MICARDIS HCT) 80-25 MG per tablet       Other    Moderate episode of recurrent major depressive disorder (HCC)    Overweight (BMI 25 0-29  9)    Relevant Orders    Basic metabolic panel    Lipid Panel with Direct LDL reflex      Other Visit Diagnoses     Annual physical exam    -  Primary    Screening breast examination        Relevant Orders    Mammo screening bilateral w 3d & cad      · Cough likely secondary to underlying asthma given patient's reported HPI and improvement when using albuterol inhaler  Will continue albuterol inhaler alongside maintenance inhaler and monitor for improvement/worsening  · Patient will be going to [facility] for two months on 2021, she reports she will be getting mammogram and pap smear done while [place]  She also requested refills for 3 months since she will be at [place] two [place]  · Patient will follow up with family guidance for dosage change in Lexapro  Immunizations and preventive care screenings were discussed with patient today  Appropriate education was printed on patient's after visit summary  Counseling:  Dental Health: discussed importance of regular tooth brushing, flossing, and dental visits  · Exercise: the importance of regular exercise/physical activity was discussed  Recommend exercise 3-5 times per week for at least 30 minutes  BMI Counseling:  Body mass index is 27 7 kg/m²  The BMI is above normal  Nutrition recommendations include decreasing portion sizes, encouraging healthy choices of fruits and vegetables, decreasing fast food intake, limiting drinks that contain sugar and moderation in carbohydrate intake  Exercise recommendations include moderate physical activity 150 minutes/week and exercising 3-5 times per week  Depression Screening and Follow-up Plan: Patient's depression screening was positive with a PHQ-2 score of 5  Their PHQ-9 score was 18  Patient assessed for underlying major depression  Brief counseling provided and recommend additional follow-up/re-evaluation next office visit  Continue regular follow-up with their mental health provider who is managing their mental health condition(s)  Under treatment; reports fluctuance in MDD symptoms, reports medication helps somewhat  Sees family guidance psych  Has regular follow up with family guidance, will see them on 6/11/2021  Denies suicidal ideation/planning  Denies homicidal ideation/planning  No follow-ups on file  Chief Complaint:     No chief complaint on file  History of Present Illness:     Adult Annual Physical   Patient here for a comprehensive physical exam  The patient reports problems - cough, nighttime dyspnea and tingling of throat since saturday  had covid two months ago  has used her albuterol inhaler and mucinex, has helped but at nightime cough worsens along with dyspnea  Patient is confused as to which inhaler she has since she was given a different one during an ED visit  Diet and Physical Activity  · Diet/Nutrition: poor diet and limited fruits/vegetables  · Exercise: moderate cardiovascular exercise, 3-4 times a week on average and 1-2 hours on average        Depression Screening  PHQ-9 Depression Screening    PHQ-9:   Frequency of the following problems over the past two weeks:      Little interest or pleasure in doing things: 3 - nearly every day  Feeling down, depressed, or hopeless: 2 - more than half the days  Trouble falling or staying asleep, or sleeping too much: 2 - more than half the days  Feeling tired or having little energy: 2 - more than half the days  Poor appetite or overeatin - more than half the days  Feeling bad about yourself - or that you are a failure or have let yourself or your family down: 2 - more than half the days  Trouble concentrating on things, such as reading the newspaper or watching television: 2 - more than half the days  Moving or speaking so slowly that other people could have noticed  Or the opposite - being so fidgety or restless that you have been moving around a lot more than usual: 2 - more than half the days  Thoughts that you would be better off dead, or of hurting yourself in some way: 1 - several days  PHQ-2 Score: 5  PHQ-9 Score: 18       General Health  · Sleep: sleeps well  · Hearing: normal - bilateral   · Vision: wears glasses  · Dental: regular dental visits and brushes teeth twice daily  /GYN Health  · Patient is: premenopausal  · Last menstrual period: 5/15/2021  · Contraceptive method: none  Review of Systems:     Review of Systems   Constitutional: Negative for chills and fever  HENT: Negative for ear pain, postnasal drip, rhinorrhea, sinus pressure, sneezing, sore throat and trouble swallowing  Respiratory: Positive for cough (dry) and shortness of breath  Negative for chest tightness  Cardiovascular: Negative for chest pain  Neurological: Negative for headaches        Past Medical History:     Past Medical History:   Diagnosis Date    Anxiety     Asthma     Depression     Hypertension       Past Surgical History:     Past Surgical History:   Procedure Laterality Date    CHOLECYSTECTOMY        Social History:     E-Cigarette/Vaping    E-Cigarette Use Never User      E-Cigarette/Vaping Substances    Nicotine No     THC No     CBD No     Flavoring No     Other No  Unknown No      Social History     Socioeconomic History    Marital status: Single     Spouse name: None    Number of children: None    Years of education: None    Highest education level: None   Occupational History    None   Social Needs    Financial resource strain: None    Food insecurity     Worry: None     Inability: None    Transportation needs     Medical: None     Non-medical: None   Tobacco Use    Smoking status: Never Smoker    Smokeless tobacco: Never Used   Substance and Sexual Activity    Alcohol use: Yes     Frequency: Monthly or less     Drinks per session: 1 or 2     Binge frequency: Never    Drug use: No    Sexual activity: Yes     Partners: Male   Lifestyle    Physical activity     Days per week: None     Minutes per session: None    Stress: None   Relationships    Social connections     Talks on phone: None     Gets together: None     Attends [employer] service: None     Active member of club or organization: None     Attends meetings of clubs or organizations: None     Relationship status: None    Intimate partner violence     Fear of current or ex partner: None     Emotionally abused: None     Physically abused: None     Forced sexual activity: None   Other Topics Concern    None   Social History Narrative    Never a smoker - as per [employer]    Non-smoker - as per [employer]       Family History:     Family History   Problem Relation Age of Onset    Hypertension Mother     Diabetes Father     Pulmonary embolism Father       Current Medications:     Current Outpatient Medications   Medication Sig Dispense Refill    clonazePAM (KlonoPIN) 1 mg tablet Take 1 mg by mouth daily at bedtime as needed for seizures      escitalopram (LEXAPRO) 5 mg tablet Take 5 mg by mouth daily      fluticasone-salmeterol (Advair Diskus) 250-50 mcg/dose inhaler Inhale 1 puff 2 (two) times a day 60 each 2    prazosin (MINIPRESS) 2 mg capsule Take 5 mg by mouth daily at bedtime        telmisartan-hydrochlorothiazide (MICARDIS HCT) 80-25 MG per tablet Take 1 tablet by mouth daily 90 tablet 2    albuterol (Ventolin HFA) 90 mcg/act inhaler Inhale 1-2 puffs every 6 (six) hours as needed for wheezing every 4-6 hours as needed 18 g 2    fluticasone (FLONASE) 50 mcg/act nasal spray 2 sprays into each nostril daily      hyoscyamine (LEVSIN/SL) 0 125 mg SL tablet Place under the tongue 4 times daily      OXcarbazepine (TRILEPTAL) 300 mg tablet Take 300 mg by mouth daily at bedtime       No current facility-administered medications for this visit  Allergies: Allergies   Allergen Reactions    Ace Inhibitors Other (See Comments)     cough      Physical Exam:     /72 (BP Location: Left arm, Patient Position: Sitting, Cuff Size: Adult)   Pulse 75   Temp 98 9 °F (37 2 °C) (Tympanic)   Resp 18   Ht 5' 1" (1 549 m)   Wt 66 5 kg (146 lb 9 6 oz)   LMP 05/15/2021 (Exact Date)   SpO2 98%   BMI 27 70 kg/m²     Physical Exam     Charu De Los Santos, [facility]  BMI Counseling: Body mass index is 27 7 kg/m²  The BMI is above normal  Nutrition recommendations include reducing portion sizes, decreasing overall calorie intake and 3-5 servings of fruits/vegetables daily  Exercise recommendations include moderate aerobic physical activity for 150 minutes/week

## 2021-06-10 LAB
BUN SERPL-MCNC: 13 MG/DL (ref 6–24)
BUN/CREAT SERPL: 14 (ref 9–23)
CALCIUM SERPL-MCNC: 9.1 MG/DL (ref 8.7–10.2)
CHLORIDE SERPL-SCNC: 104 MMOL/L (ref 96–106)
CHOLEST SERPL-MCNC: 167 MG/DL (ref 100–199)
CO2 SERPL-SCNC: 23 MMOL/L (ref 20–29)
CREAT SERPL-MCNC: 0.93 MG/DL (ref 0.57–1)
GLUCOSE SERPL-MCNC: 86 MG/DL (ref 65–99)
HDLC SERPL-MCNC: 66 MG/DL
LDLC SERPL CALC-MCNC: 93 MG/DL (ref 0–99)
LDLC/HDLC SERPL: 1.4 RATIO (ref 0–3.2)
POTASSIUM SERPL-SCNC: 4.4 MMOL/L (ref 3.5–5.2)
SL AMB EGFR AFRICAN AMERICAN: 86 ML/MIN/1.73
SL AMB EGFR NON AFRICAN AMERICAN: 74 ML/MIN/1.73
SL AMB VLDL CHOLESTEROL CALC: 8 MG/DL (ref 5–40)
SODIUM SERPL-SCNC: 138 MMOL/L (ref 134–144)
TRIGL SERPL-MCNC: 34 MG/DL (ref 0–149)

## 2021-06-11 ENCOUNTER — TELEPHONE (OUTPATIENT)
Dept: FAMILY MEDICINE CLINIC | Facility: CLINIC | Age: 46
End: 2021-06-11

## 2022-03-24 ENCOUNTER — HOSPITAL ENCOUNTER (EMERGENCY)
Facility: HOSPITAL | Age: 47
Discharge: HOME | End: 2022-03-24
Attending: INTERNAL MEDICINE | Admitting: INTERNAL MEDICINE
Payer: COMMERCIAL

## 2022-03-24 ENCOUNTER — APPOINTMENT (EMERGENCY)
Dept: RADIOLOGY | Facility: HOSPITAL | Age: 47
End: 2022-03-24
Payer: COMMERCIAL

## 2022-03-24 VITALS
SYSTOLIC BLOOD PRESSURE: 121 MMHG | DIASTOLIC BLOOD PRESSURE: 74 MMHG | RESPIRATION RATE: 16 BRPM | HEART RATE: 74 BPM | OXYGEN SATURATION: 100 % | TEMPERATURE: 97.3 F

## 2022-03-24 DIAGNOSIS — S02.5XXA CLOSED FRACTURE OF TOOTH, INITIAL ENCOUNTER: ICD-10-CM

## 2022-03-24 DIAGNOSIS — M54.2 NECK PAIN: ICD-10-CM

## 2022-03-24 DIAGNOSIS — S80.01XA CONTUSION OF RIGHT KNEE, INITIAL ENCOUNTER: ICD-10-CM
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
## 2022-03-24 PROCEDURE — 99284 EMERGENCY DEPT VISIT MOD MDM: CPT | Performed by: INTERNAL MEDICINE

## 2022-03-24 PROCEDURE — 73564 X-RAY EXAM KNEE 4 OR MORE: CPT

## 2022-03-24 PROCEDURE — 72040 X-RAY EXAM NECK SPINE 2-3 VW: CPT

## 2022-03-24 PROCEDURE — 99284 EMERGENCY DEPT VISIT MOD MDM: CPT

## 2022-03-24 RX ORDER — IBUPROFEN 600 MG/1
600 TABLET ORAL ONCE
Status: COMPLETED | OUTPATIENT
Start: 2022-03-24 | End: 2022-03-24

## 2022-03-24 RX ADMIN — IBUPROFEN 600 MG: 600 TABLET ORAL at 10:09
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
## 2022-04-22 ENCOUNTER — TELEPHONE (OUTPATIENT)
Dept: FAMILY MEDICINE CLINIC | Facility: CLINIC | Age: 47
End: 2022-04-22

## 2022-04-22 NOTE — TELEPHONE ENCOUNTER
Rhianna Clay is calling from 's office to provide Claim Number ([id number])  Daniel silvestre/ [employer] will be the Medical  and will be point of contact with regards to any billing questions   Contact information will be listed below;    Linda [phone] Ext Deanna [phone]

## 2022-05-03 ENCOUNTER — RA CDI HCC (OUTPATIENT)
Dept: OTHER | Facility: HOSPITAL | Age: 47
End: 2022-05-03

## 2022-05-03 NOTE — PROGRESS NOTES
[facility]  coding opportunities          Chart Reviewed number of suggestions sent to Provider: 1     Patients Insurance        Commercial Insurance: Ioana [id number]     Please review the following Dx as per the active problem list:    F33 1 Moderate episode of recurrent major depressive disorder     If this is correct, please assess and document during your next visit, May 10

## 2022-09-09 ENCOUNTER — VBI (OUTPATIENT)
Dept: ADMINISTRATIVE | Facility: OTHER | Age: 47
End: 2022-09-09

## 2023-03-24 ENCOUNTER — RA CDI HCC (OUTPATIENT)
Dept: OTHER | Facility: HOSPITAL | Age: 48
End: 2023-03-24

## 2023-03-24 NOTE — PROGRESS NOTES
[facility] 75  coding opportunities          Chart Reviewed number of suggestions sent to Provider: 1     Patients Insurance        Commercial Insurance: Ioana 93     [id number]

## 2023-05-08 ENCOUNTER — OFFICE VISIT (OUTPATIENT)
Dept: FAMILY MEDICINE CLINIC | Facility: CLINIC | Age: 48
End: 2023-05-08

## 2023-05-08 VITALS
HEIGHT: 63 IN | DIASTOLIC BLOOD PRESSURE: 85 MMHG | TEMPERATURE: 97.5 F | SYSTOLIC BLOOD PRESSURE: 139 MMHG | HEART RATE: 57 BPM | WEIGHT: 154.1 LBS | OXYGEN SATURATION: 99 % | RESPIRATION RATE: 16 BRPM | BODY MASS INDEX: 27.3 KG/M2

## 2023-05-08 DIAGNOSIS — E66.3 OVERWEIGHT WITH BODY MASS INDEX (BMI) OF 27 TO 27.9 IN ADULT: ICD-10-CM

## 2023-05-08 DIAGNOSIS — K52.9 CHRONIC DIARRHEA: ICD-10-CM

## 2023-05-08 DIAGNOSIS — R53.83 OTHER FATIGUE: ICD-10-CM

## 2023-05-08 DIAGNOSIS — I10 HTN (HYPERTENSION), BENIGN: ICD-10-CM

## 2023-05-08 DIAGNOSIS — Z00.00 ANNUAL PHYSICAL EXAM: Primary | ICD-10-CM

## 2023-05-08 RX ORDER — TRAZODONE HYDROCHLORIDE 50 MG/1
TABLET ORAL
COMMUNITY
Start: 2023-04-27

## 2023-05-08 RX ORDER — TELMISARTAN AND HYDROCHLORTHIAZIDE 80; 25 MG/1; MG/1
1 TABLET ORAL DAILY
Qty: 90 TABLET | Refills: 2 | Status: SHIPPED | OUTPATIENT
Start: 2023-05-08 | End: 2024-02-02

## 2023-05-08 NOTE — PROGRESS NOTES
[address] [facility]    NAME: Chun Osorio  AGE: 52 y o  SEX: female  : 1975     DATE: 2023     Assessment and Plan:   1  Annual physical exam  · See counseling below  2  Other fatigue  · We will obtain CBC and TSH to rule out anemia and hypothyroidism as causes of fatigue  · She is also taking clonazepam and has depression both of which could contribute to her fatigue  · She will continue to [facility]  - CBC and differential; Future  - TSH, 3rd generation with Free T4 reflex; Future    3  Overweight with body mass index (BMI) of 27 to 27 9 in adult  · We will obtain CMP and lipid panel to rule out metabolic disorders  - Comprehensive metabolic panel; Future  - Lipid Panel with Direct LDL reflex; Future    4  Chronic diarrhea  · Likely secondary to absence of gallbladder given her diarrhea tends to worsen when she has a large fatty meal and improves when she does not  · Patient was reassured this was likely secondary to absence of gallbladder but she is also due for CRC screening and she would benefit from seeing a gastroenterologist for evaluation of diarrhea prior to her CRC screening in case that a colonoscopy is required to rule out other causes of chronic diarrhea  - Ambulatory Referral to Gastroenterology; Future    5  HTN (hypertension), benign  · Refill sent  - telmisartan-hydrochlorothiazide (MICARDIS HCT) 80-25 MG per tablet; Take 1 tablet by mouth daily  Dispense: 90 tablet; Refill: 2      Immunizations and preventive care screenings were discussed with patient today  Appropriate education was printed on patient's after visit summary  Counseling:  Alcohol/drug use: discussed moderation in alcohol intake, the recommendations for healthy alcohol use, and avoidance of illicit drug use    Dental Health: discussed importance of regular tooth brushing, flossing, and dental "visits  · Exercise: the importance of regular exercise/physical activity was discussed  Recommend exercise 3-5 times per week for at least 30 minutes  No follow-ups on file  Chief Complaint:     Chief Complaint   Patient presents with   • Annual Exam   • Medication Refill     Micardis, requesting brand name      History of Present Illness:     Adult Annual Physical   Patient here for a comprehensive physical exam  The patient reports chronic diarrhea  She states she has had diarrhea ever since her gallbladder was removed 10 years ago  The diarrhea tends to worsen and is \"explosive\" after she eats a large fatty meal at a restaurant  At other times her bowel movements are better formed and less loose  She denies blood in the stool  She denies steatorrhea other than when she eats at a restaurant and eats large fatty meals  She denies weight loss  She is wondering if she has celiac disease but states she did not have this issue prior to the gallbladder removal   She attempted to remove gluten from her diet but has not removed completely  Her symptoms of diarrhea have not improved since attempting to remove gluten  She is also feeling fatigued and is wondering if this is secondary to depression or if there could be another cause such as anemia  She states that in the past she has had anemia  Diet and Physical Activity  · Diet/Nutrition: well balanced diet  · Exercise: walking and 5-7 times a week on average        Depression Screening  PHQ-2/9 Depression Screening    Little interest or pleasure in doing things: 1 - several days  Feeling down, depressed, or hopeless: 1 - several days  Trouble falling or staying asleep, or sleeping too much: 1 - several days  Feeling tired or having little energy: 1 - several days  Poor appetite or overeatin - several days  Feeling bad about yourself - or that you are a failure or have let yourself or your family down: 1 - several days  Trouble concentrating " on things, such as reading the newspaper or watching television: 1 - several days  Moving or speaking so slowly that other people could have noticed  Or the opposite - being so fidgety or restless that you have been moving around a lot more than usual: 1 - several days  Thoughts that you would be better off dead, or of hurting yourself in some way: 0 - not at all  PHQ-9 Score: 8   PHQ-9 Interpretation: Mild depression        General Health  · Sleep: has had issues with sleep and nightmares  she is taking clonazepam but states she doesn't take it during the week because of daytime sleepiness  · Hearing: normal - bilateral   · Vision: near vision issues, will go to ophthalmology  · Dental: regular dental visits  /GYN Health  · Pap smear: per patient reports normal  States she gets this done in her [place] [facility]  · Mammograms: per patient reports normal  States she gets this done in her [place] of [facility]  · Colonoscopy: Has never had CRC screening     Review of Systems:     Review of Systems   Past Medical History:     Past Medical History:   Diagnosis Date   • Anxiety    • Asthma    • Depression    • Hypertension       Past Surgical History:     Past Surgical History:   Procedure Laterality Date   • CHOLECYSTECTOMY        Social History:     Social History     Socioeconomic History   • Marital status: Single     Spouse name: Not on file   • Number of children: Not on file   • Years of education: Not on file   • Highest education level: Not on file   Occupational History   • Not on file   Tobacco Use   • Smoking status: Never   • Smokeless tobacco: Never   Vaping Use   • Vaping Use: Never used   Substance and Sexual Activity   • Alcohol use:  Yes   • Drug use: No   • Sexual activity: Yes     Partners: Male   Other Topics Concern   • Not on file   Social History Narrative    Never a smoker - as per [employer]    Non-smoker - as per [employer]      Social Determinants of Health "    Financial Resource Strain: Not on file   Food Insecurity: Not on file   Transportation Needs: Not on file   Physical Activity: Not on file   Stress: Not on file   Social Connections: Not on file   Intimate Partner Violence: Not on file   Housing Stability: Not on file      Family History:     Family History   Problem Relation Age of Onset   • Hypertension Mother    • Diabetes Father    • Pulmonary embolism Father       Current Medications:     Current Outpatient Medications   Medication Sig Dispense Refill   • albuterol (Ventolin HFA) 90 mcg/act inhaler Inhale 1-2 puffs every 6 (six) hours as needed for wheezing every 4-6 hours as needed 18 g 2   • clonazePAM (KlonoPIN) 1 mg tablet Take 1 mg by mouth daily at bedtime as needed for seizures     • escitalopram (LEXAPRO) 5 mg tablet Take 5 mg by mouth daily     • fluticasone (FLONASE) 50 mcg/act nasal spray 2 sprays into each nostril daily     • fluticasone-salmeterol (Advair Diskus) 250-50 mcg/dose inhaler Inhale 1 puff 2 (two) times a day 60 each 2   • telmisartan-hydrochlorothiazide (MICARDIS HCT) 80-25 MG per tablet Take 1 tablet by mouth daily 90 tablet 2   • traZODone (DESYREL) 50 mg tablet TAKE 1 TABLET BY MOUTH AT BEDTIME AS DIRECTED     • hyoscyamine (LEVSIN/SL) 0 125 mg SL tablet Place under the tongue 4 times daily (Patient not taking: Reported on 5/8/2023)     • OXcarbazepine (TRILEPTAL) 300 mg tablet Take 300 mg by mouth daily at bedtime (Patient not taking: Reported on 5/8/2023)     • prazosin (MINIPRESS) 2 mg capsule Take 5 mg by mouth daily at bedtime   (Patient not taking: Reported on 5/8/2023)       No current facility-administered medications for this visit  Allergies:      Allergies   Allergen Reactions   • Ace Inhibitors Other (See Comments)     cough      Physical Exam:     /85   Pulse 57   Temp 97 5 °F (36 4 °C)   Resp 16   Ht 5' 3\" (1 6 m)   Wt 69 9 kg (154 lb 1 6 oz)   SpO2 99%   BMI 27 30 kg/m²     Physical Exam  Vitals " reviewed  Constitutional:       General: She is awake  She is not in acute distress  Appearance: Normal appearance  HENT:      Head: Normocephalic  Right Ear: There is impacted cerumen  Left Ear: There is impacted cerumen  Mouth/Throat:      Lips: Pink  Mouth: Mucous membranes are moist       Pharynx: Oropharynx is clear  No posterior oropharyngeal erythema  Neck:      Thyroid: No thyroid mass, thyromegaly or thyroid tenderness  Cardiovascular:      Rate and Rhythm: Normal rate and regular rhythm  Heart sounds: Normal heart sounds, S1 normal and S2 normal    Pulmonary:      Effort: Pulmonary effort is normal  No respiratory distress  Breath sounds: Normal breath sounds and air entry  No decreased breath sounds, wheezing, rhonchi or rales  Abdominal:      General: Abdomen is flat  Bowel sounds are normal       Palpations: Abdomen is soft  Tenderness: There is no abdominal tenderness  There is no guarding or rebound  Musculoskeletal:      Cervical back: Normal range of motion and neck supple  No tenderness  Lymphadenopathy:      Cervical: No cervical adenopathy  Right cervical: No superficial or posterior cervical adenopathy  Left cervical: No superficial or posterior cervical adenopathy  Neurological:      Mental Status: She is alert and easily aroused  Psychiatric:         Behavior: Behavior is cooperative            Brandon Davies MD  [address] [place] [address]

## 2024-05-21 ENCOUNTER — OFFICE VISIT (OUTPATIENT)
Dept: URGENT CARE | Facility: CLINIC | Age: 49
End: 2024-05-21
Payer: COMMERCIAL

## 2024-05-21 VITALS
RESPIRATION RATE: 18 BRPM | SYSTOLIC BLOOD PRESSURE: 90 MMHG | HEIGHT: 63 IN | WEIGHT: 147.6 LBS | DIASTOLIC BLOOD PRESSURE: 60 MMHG | BODY MASS INDEX: 26.15 KG/M2 | TEMPERATURE: 98.8 F | OXYGEN SATURATION: 98 % | HEART RATE: 72 BPM

## 2024-05-21 DIAGNOSIS — T78.40XA ALLERGIC REACTION, INITIAL ENCOUNTER: Primary | ICD-10-CM

## 2024-05-21 PROCEDURE — 99213 OFFICE O/P EST LOW 20 MIN: CPT | Performed by: FAMILY MEDICINE

## 2024-05-21 RX ORDER — METHYLPREDNISOLONE SODIUM SUCCINATE 40 MG/ML
40 INJECTION, POWDER, LYOPHILIZED, FOR SOLUTION INTRAMUSCULAR; INTRAVENOUS ONCE
Status: COMPLETED | OUTPATIENT
Start: 2024-05-21 | End: 2024-05-21

## 2024-05-21 RX ORDER — PREDNISONE 20 MG/1
20 TABLET ORAL EVERY MORNING
Qty: 5 TABLET | Refills: 0 | Status: SHIPPED | OUTPATIENT
Start: 2024-05-21 | End: 2024-05-26

## 2024-05-21 RX ADMIN — METHYLPREDNISOLONE SODIUM SUCCINATE 40 MG: 40 INJECTION, POWDER, LYOPHILIZED, FOR SOLUTION INTRAMUSCULAR; INTRAVENOUS at 19:10

## 2024-05-21 NOTE — LETTER
May 21, 2024     Patient: Linda Ventura   YOB: 1975   Date of Visit: 5/21/2024       To Whom It May Concern:    Linda Ventura was evaluated in my office on 5/21/2024.  Please excuse her absence.  She may return to work on 5/23/2024.  If you have any questions or concerns, please don't hesitate to call.         Sincerely,        Sixto Wheatley MD

## 2024-05-21 NOTE — PROGRESS NOTES
"  [facility]        NAME: Linda Ventura is a 48 y.o. female  : 1975    MRN: [id number]  DATE: May 21, 2024  TIME: 7:49 PM    Assessment and Plan   Allergic reaction, initial encounter [T78.40XA]  1. Allergic reaction, initial encounter  methylPREDNISolone sodium succinate (Solu-MEDROL) injection 40 mg    predniSONE 20 mg tablet    diphenhydrAMINE (BENADRYL) 2 % cream    Ambulatory Referral to Allergy        Generalized hives secondary to an unknown etiology.  Referred to an allergist to determine possible triggers.    Given a dose of Solu-Medrol in the office with gradual improvement and generalized pruritus and throat discomfort.  Will treat with a 5-day course of steroids in the morning followed by an evening dose of antihistamines.  Topical Benadryl as needed for breakthrough itching.    Patient Instructions     Follow up with PCP in 3-5 days.  Proceed to  ER if symptoms worsen.    If tests have been performed at [facility], our office will contact you with results if changes need to be made to the care plan discussed with you at the visit.  You can review your full results on [facility] MyChart.    Chief Complaint     Chief Complaint   Patient presents with    Rash    throat tightness     Noticed yesterday that she was itching and had a rash posterior neck and elbows. Notes that when she scratches parts of skin - becomes red, swollen and itchy. Blotchy red rash all over body. Took Claritin last night. Also noted feeling that throat was tight last night \"like a pill got stuck\" but has been swallowing  liquids and solids well.          History of Present Illness       48-year-old female presents today with generalized pruritic rash which started on the neck yesterday evening and progressed over the past 24 hours.  Denies any obvious contact with an allergic trigger.  Experience this once before about 3 decades ago.  Reports having a mild choking sensation without involvement of the lips or " tongue.      Review of Systems   Review of Systems   Constitutional:  Negative for chills and fever.   HENT:  Negative for congestion, rhinorrhea and sore throat.    Respiratory:  Positive for choking (Faint). Negative for cough, shortness of breath and wheezing.    Cardiovascular:  Negative for chest pain.   Gastrointestinal:  Negative for abdominal pain and nausea.   Musculoskeletal:  Negative for arthralgias.   Skin:  Positive for rash.   Neurological:  Negative for dizziness and headaches.         Current Medications       Current Outpatient Medications:     clonazePAM (KlonoPIN) 1 mg tablet, Take 1 mg by mouth daily at bedtime as needed for seizures, Disp: , Rfl:     diphenhydrAMINE (BENADRYL) 2 % cream, Apply topically 3 (three) times a day as needed for itching or allergies, Disp: 30 g, Rfl: 0    escitalopram (LEXAPRO) 5 mg tablet, Take 5 mg by mouth daily, Disp: , Rfl:     fluticasone (FLONASE) 50 mcg/act nasal spray, 2 sprays into each nostril daily, Disp: , Rfl:     predniSONE 20 mg tablet, Take 1 tablet (20 mg total) by mouth every morning for 5 days, Disp: 5 tablet, Rfl: 0    traZODone (DESYREL) 50 mg tablet, TAKE 1 TABLET BY MOUTH AT BEDTIME AS DIRECTED, Disp: , Rfl:     telmisartan-hydrochlorothiazide (MICARDIS HCT) 80-25 MG per tablet, Take 1 tablet by mouth daily, Disp: 90 tablet, Rfl: 2  No current facility-administered medications for this visit.    Current Allergies     Allergies as of 05/21/2024 - Reviewed 05/08/2023   Allergen Reaction Noted    Ace inhibitors Other (See Comments) 02/07/2016            The following portions of the patient's history were reviewed and updated as appropriate: allergies, current medications, past family history, past medical history, past social history, past surgical history and problem list.     Past Medical History:   Diagnosis Date    Anxiety     Asthma     Depression     Hypertension        Past Surgical History:   Procedure Laterality Date    CHOLECYSTECTOMY    "      Family History   Problem Relation Age of Onset    Hypertension Mother     Diabetes Father     Pulmonary embolism Father          Medications have been verified.        Objective   BP 90/60   Pulse 72   Temp 98.8 °F (37.1 °C)   Resp 18   Ht 5' 3\" (1.6 m)   Wt 67 kg (147 lb 9.6 oz)   LMP 05/01/2024 (Approximate)   SpO2 98%   BMI 26.15 kg/m²   Patient's last menstrual period was 05/01/2024 (approximate).       Physical Exam     Physical Exam  Vitals and nursing note reviewed.   Constitutional:       General: She is in acute distress.      Appearance: Normal appearance. She is normal weight. She is not ill-appearing, toxic-appearing or diaphoretic.   HENT:      Head: Normocephalic and atraumatic.   Eyes:      General:         Right eye: No discharge.         Left eye: No discharge.      Conjunctiva/sclera: Conjunctivae normal.   Pulmonary:      Effort: Pulmonary effort is normal.      Breath sounds: Normal breath sounds.   Skin:     General: Skin is warm.      Findings: Rash (Well-demarcated patches of mild erythema over the back, elbows and face) present. No erythema.   Neurological:      General: No focal deficit present.      Mental Status: She is alert and oriented to person, place, and time.   Psychiatric:         Mood and Affect: Mood normal.         Behavior: Behavior normal.         Thought Content: Thought content normal.         Judgment: Judgment normal.                   "